# Patient Record
Sex: MALE | Race: WHITE | NOT HISPANIC OR LATINO | Employment: UNEMPLOYED | URBAN - NONMETROPOLITAN AREA
[De-identification: names, ages, dates, MRNs, and addresses within clinical notes are randomized per-mention and may not be internally consistent; named-entity substitution may affect disease eponyms.]

---

## 2022-08-10 RX ORDER — ESCITALOPRAM OXALATE 20 MG/1
TABLET ORAL
Qty: 90 TABLET | OUTPATIENT
Start: 2022-08-10

## 2022-08-10 RX ORDER — LANSOPRAZOLE 30 MG/1
CAPSULE, DELAYED RELEASE ORAL
Qty: 90 CAPSULE | OUTPATIENT
Start: 2022-08-10

## 2022-08-12 RX ORDER — LANSOPRAZOLE 30 MG/1
CAPSULE, DELAYED RELEASE ORAL
Qty: 90 CAPSULE | Refills: 0 | Status: SHIPPED | OUTPATIENT
Start: 2022-08-12 | End: 2022-08-16 | Stop reason: SDUPTHER

## 2022-08-12 RX ORDER — ESCITALOPRAM OXALATE 20 MG/1
TABLET ORAL
Qty: 90 TABLET | OUTPATIENT
Start: 2022-08-12

## 2022-08-15 NOTE — TELEPHONE ENCOUNTER
\Caller: Armando Mcconnell    Relationship: Self    Best call back number: [phone]    Requested Prescriptions:   Requested Prescriptions     Refused Prescriptions Disp Refills   • escitalopram (LEXAPRO) 20 MG tablet [Pharmacy Med Name: ESCITALOPRAM 20 MG TABLET] 90 tablet      Sig: TAKE ONE TABLET BY MOUTH DAILY FOR ANXIETY/IBS     Refused By: ROCIO FORREST     Reason for Refusal: Patient needs an appointment        Pharmacy where request should be sent: [facility] [address] - [phone]  - [phone] FX     Additional details provided by patient: PATIENT IS OUT OF MEDICATION. WILL BE SEEN 08/16    Does the patient have less than a 3 day supply:  [x] Yes  [] No    Fabrice Villarreal Rep   08/15/22 09:38 EDT

## 2022-08-16 ENCOUNTER — TELEMEDICINE (OUTPATIENT)
Dept: FAMILY MEDICINE CLINIC | Facility: CLINIC | Age: 27
End: 2022-08-16

## 2022-08-16 VITALS — WEIGHT: 270 LBS | BODY MASS INDEX: 36.57 KG/M2 | HEIGHT: 72 IN

## 2022-08-16 DIAGNOSIS — K58.0 IRRITABLE BOWEL SYNDROME WITH DIARRHEA: ICD-10-CM

## 2022-08-16 DIAGNOSIS — E66.09 CLASS 2 OBESITY DUE TO EXCESS CALORIES WITHOUT SERIOUS COMORBIDITY WITH BODY MASS INDEX (BMI) OF 36.0 TO 36.9 IN ADULT: ICD-10-CM

## 2022-08-16 DIAGNOSIS — Z00.00 GENERAL MEDICAL EXAM: Primary | ICD-10-CM

## 2022-08-16 DIAGNOSIS — F41.9 ANXIETY: ICD-10-CM

## 2022-08-16 DIAGNOSIS — J30.1 SEASONAL ALLERGIC RHINITIS DUE TO POLLEN: ICD-10-CM

## 2022-08-16 DIAGNOSIS — K21.9 GERD WITHOUT ESOPHAGITIS: ICD-10-CM

## 2022-08-16 DIAGNOSIS — Z13.1 DIABETES MELLITUS SCREENING: ICD-10-CM

## 2022-08-16 PROBLEM — E66.812 CLASS 2 OBESITY DUE TO EXCESS CALORIES WITHOUT SERIOUS COMORBIDITY WITH BODY MASS INDEX (BMI) OF 36.0 TO 36.9 IN ADULT: Status: ACTIVE | Noted: 2022-08-16

## 2022-08-16 PROCEDURE — 99395 PREV VISIT EST AGE 18-39: CPT | Performed by: FAMILY MEDICINE

## 2022-08-16 RX ORDER — ESCITALOPRAM OXALATE 20 MG/1
20 TABLET ORAL DAILY
Qty: 90 TABLET | Refills: 1 | Status: SHIPPED | OUTPATIENT
Start: 2022-08-16 | End: 2023-02-01 | Stop reason: SDUPTHER

## 2022-08-16 RX ORDER — ESCITALOPRAM OXALATE 20 MG/1
20 TABLET ORAL DAILY
COMMUNITY
End: 2022-08-16 | Stop reason: SDUPTHER

## 2022-08-16 RX ORDER — CETIRIZINE HYDROCHLORIDE 10 MG/1
10 CAPSULE, LIQUID FILLED ORAL EVERY 24 HOURS
Qty: 90 CAPSULE | Refills: 1 | Status: SHIPPED | OUTPATIENT
Start: 2022-08-16 | End: 2023-02-01 | Stop reason: SDUPTHER

## 2022-08-16 RX ORDER — FLUTICASONE PROPIONATE 50 MCG
2 SPRAY, SUSPENSION (ML) NASAL DAILY
Qty: 48 G | Refills: 1 | Status: SHIPPED | OUTPATIENT
Start: 2022-08-16 | End: 2023-02-01 | Stop reason: SDUPTHER

## 2022-08-16 RX ORDER — FLUTICASONE PROPIONATE 50 MCG
SPRAY, SUSPENSION (ML) NASAL
COMMUNITY
Start: 2022-08-12 | End: 2022-08-16 | Stop reason: SDUPTHER

## 2022-08-16 RX ORDER — LANSOPRAZOLE 30 MG/1
30 CAPSULE, DELAYED RELEASE ORAL DAILY
Qty: 90 CAPSULE | Refills: 1 | Status: SHIPPED | OUTPATIENT
Start: 2022-08-16 | End: 2023-02-01 | Stop reason: SDUPTHER

## 2022-08-16 RX ORDER — CETIRIZINE HYDROCHLORIDE 10 MG/1
CAPSULE, LIQUID FILLED ORAL EVERY 24 HOURS
COMMUNITY
End: 2022-08-16 | Stop reason: SDUPTHER

## 2022-08-16 NOTE — ASSESSMENT & PLAN NOTE
Controlled with Lexapro.  Tapered off Elavil.  Using dicyclomine infrequently    Colonoscopy up-to-date with GI

## 2022-08-16 NOTE — ASSESSMENT & PLAN NOTE
Controlled with Prevacid.  No dysphagia.    EGD up-to-date with GI at [facility]

## 2022-08-16 NOTE — ASSESSMENT & PLAN NOTE
Reviewed health maintenance and screening.    Will return to get fasting blood work up-to-date.  Encourage diet and exercise efforts

## 2022-08-16 NOTE — PROGRESS NOTES
"Patient was seen today through synchronous audio/video technology. Verbal consent was obtained. The patient was located at home. Vitals signs were not obtained due to lack of home monitoring access.       Chief Complaint  Anxiety    History of Present Illness  Armando Mcconnell is a 27 y.o. male presenting via video-conference today for telehealth physical exam and refills on his Lexapro for anxiety.    Doing well since our last visit together this has been much better controlled was started on Lexapro.  This also helps his anxiety greatly and he is tapered off Elavil for IBS symptoms.  Using dicyclomine very infrequently.    GERD controlled with Prevacid.    Allergies controlled with Zyrtec and Flonase.    Given his family history of diabetes he would like to come in for yearly blood work.  This was last done in July 2021.    The following portions of the patient's history were reviewed and updated as appropriate: allergies, current medications, past family history, past medical history, past social history, past surgical history and problem list.    Review of Systems   Constitutional: Negative for appetite change, chills, fever and unexpected weight change.   HENT: Negative for hearing loss.         Allergies stable with Zyrtec and Flonase.   Eyes: Negative for visual disturbance.   Respiratory: Negative for chest tightness, shortness of breath and wheezing.    Cardiovascular: Negative for chest pain, palpitations and leg swelling.   Gastrointestinal: Negative for abdominal pain.   Musculoskeletal: Negative for arthralgias, back pain and gait problem.   Skin: Negative for rash.   Neurological: Negative for dizziness and headaches.   Psychiatric/Behavioral: Negative for agitation and confusion. The patient is not nervous/anxious.        Objective  Ht 182.9 cm (72\")   Wt 122 kg (270 lb)   BMI 36.62 kg/m²     Physical Exam  Constitutional:       Appearance: He is obese.   HENT:      Head: Normocephalic.      " Right Ear: External ear normal.      Left Ear: External ear normal.      Nose: Nose normal.   Eyes:      Pupils: Pupils are equal, round, and reactive to light.   Cardiovascular:      Rate and Rhythm: Normal rate and regular rhythm.      Heart sounds: Normal heart sounds.   Pulmonary:      Effort: Pulmonary effort is normal.      Breath sounds: Normal breath sounds.   Musculoskeletal:         General: Normal range of motion.      Cervical back: Normal range of motion.   Skin:     General: Skin is warm and dry.   Neurological:      General: No focal deficit present.      Mental Status: He is alert.   Psychiatric:         Mood and Affect: Mood normal.         Behavior: Behavior normal.         Thought Content: Thought content normal.           Assessment/Plan   Diagnoses and all orders for this visit:    1. General medical exam (Primary)  Assessment & Plan:  Reviewed health maintenance and screening.    Will return to get fasting blood work up-to-date.  Encourage diet and exercise efforts    Orders:  -     CBC Auto Differential; Future  -     Comprehensive Metabolic Panel; Future  -     Lipid Panel; Future  -     TSH; Future  -     T4, Free; Future    2. Diabetes mellitus screening  -     Hemoglobin A1c; Future    3. Anxiety  Assessment & Plan:  Well-controlled with Lexapro.  Refilled    Orders:  -     escitalopram (LEXAPRO) 20 MG tablet; Take 1 tablet by mouth Daily.  Dispense: 90 tablet; Refill: 1    4. GERD without esophagitis  Assessment & Plan:  Controlled with Prevacid.  No dysphagia.    EGD up-to-date with GI at [facility]    Orders:  -     lansoprazole (PREVACID) 30 MG capsule; Take 1 capsule by mouth Daily.  Dispense: 90 capsule; Refill: 1    5. Seasonal allergic rhinitis due to pollen  Assessment & Plan:  Continue Zyrtec and Flonase.  Refilled    Orders:  -     Cetirizine HCl (ZyrTEC Allergy) 10 MG capsule; Take 10 mg by mouth Daily.  Dispense: 90 capsule; Refill: 1  -     fluticasone (FLONASE) 50  MCG/ACT nasal spray; 2 sprays into the nostril(s) as directed by provider Daily.  Dispense: 48 g; Refill: 1    6. Irritable bowel syndrome with diarrhea  Assessment & Plan:  Controlled with Lexapro.  Tapered off Elavil.  Using dicyclomine infrequently    Colonoscopy up-to-date with GI       7. Class 2 obesity due to excess calories without serious comorbidity with body mass index (BMI) of 36.0 to 36.9 in adult  Assessment & Plan:  Patient's (Body mass index is 36.62 kg/m².) indicates that they are obese (BMI >30) with health conditions that include none . Weight is unchanged. BMI is is above average; BMI management plan is completed. We discussed portion control and increasing exercise.         No follow-ups on file.    Clay Kwon MD

## 2022-08-16 NOTE — ASSESSMENT & PLAN NOTE
Patient's (Body mass index is 36.62 kg/m².) indicates that they are obese (BMI >30) with health conditions that include none . Weight is unchanged. BMI is is above average; BMI management plan is completed. We discussed portion control and increasing exercise.

## 2022-08-19 ENCOUNTER — LAB (OUTPATIENT)
Dept: FAMILY MEDICINE CLINIC | Facility: CLINIC | Age: 27
End: 2022-08-19

## 2022-08-19 DIAGNOSIS — Z00.00 GENERAL MEDICAL EXAM: ICD-10-CM

## 2022-08-19 DIAGNOSIS — Z13.1 DIABETES MELLITUS SCREENING: ICD-10-CM

## 2022-08-19 PROCEDURE — 36415 COLL VENOUS BLD VENIPUNCTURE: CPT | Performed by: FAMILY MEDICINE

## 2022-08-20 ENCOUNTER — PATIENT MESSAGE (OUTPATIENT)
Dept: FAMILY MEDICINE CLINIC | Facility: CLINIC | Age: 27
End: 2022-08-20

## 2022-08-20 LAB
ALBUMIN SERPL-MCNC: 4.8 G/DL (ref 4.1–5.2)
ALBUMIN/GLOB SERPL: 2.4 {RATIO} (ref 1.2–2.2)
ALP SERPL-CCNC: 87 IU/L (ref 44–121)
ALT SERPL-CCNC: 18 IU/L (ref 0–44)
AST SERPL-CCNC: 18 IU/L (ref 0–40)
BASOPHILS # BLD AUTO: 0.1 X10E3/UL (ref 0–0.2)
BASOPHILS NFR BLD AUTO: 1 %
BILIRUB SERPL-MCNC: 0.3 MG/DL (ref 0–1.2)
BUN SERPL-MCNC: 12 MG/DL (ref 6–20)
BUN/CREAT SERPL: 12 (ref 9–20)
CALCIUM SERPL-MCNC: 9.4 MG/DL (ref 8.7–10.2)
CHLORIDE SERPL-SCNC: 104 MMOL/L (ref 96–106)
CHOLEST SERPL-MCNC: 168 MG/DL (ref 100–199)
CO2 SERPL-SCNC: 24 MMOL/L (ref 20–29)
CREAT SERPL-MCNC: 0.99 MG/DL (ref 0.76–1.27)
EGFRCR-CYS SERPLBLD CKD-EPI 2021: 108 ML/MIN/1.73
EOSINOPHIL # BLD AUTO: 0.2 X10E3/UL (ref 0–0.4)
EOSINOPHIL NFR BLD AUTO: 3 %
ERYTHROCYTE [DISTWIDTH] IN BLOOD BY AUTOMATED COUNT: 12.4 % (ref 11.6–15.4)
GLOBULIN SER CALC-MCNC: 2 G/DL (ref 1.5–4.5)
GLUCOSE SERPL-MCNC: 108 MG/DL (ref 65–99)
HBA1C MFR BLD: 5.6 % (ref 4.8–5.6)
HCT VFR BLD AUTO: 47.5 % (ref 37.5–51)
HDLC SERPL-MCNC: 47 MG/DL
HGB BLD-MCNC: 15.5 G/DL (ref 13–17.7)
IMM GRANULOCYTES # BLD AUTO: 0 X10E3/UL (ref 0–0.1)
IMM GRANULOCYTES NFR BLD AUTO: 0 %
LDLC SERPL CALC-MCNC: 110 MG/DL (ref 0–99)
LYMPHOCYTES # BLD AUTO: 2.3 X10E3/UL (ref 0.7–3.1)
LYMPHOCYTES NFR BLD AUTO: 42 %
MCH RBC QN AUTO: 30.3 PG (ref 26.6–33)
MCHC RBC AUTO-ENTMCNC: 32.6 G/DL (ref 31.5–35.7)
MCV RBC AUTO: 93 FL (ref 79–97)
MONOCYTES # BLD AUTO: 0.5 X10E3/UL (ref 0.1–0.9)
MONOCYTES NFR BLD AUTO: 10 %
NEUTROPHILS # BLD AUTO: 2.4 X10E3/UL (ref 1.4–7)
NEUTROPHILS NFR BLD AUTO: 44 %
PLATELET # BLD AUTO: 325 X10E3/UL (ref 150–450)
POTASSIUM SERPL-SCNC: 4.8 MMOL/L (ref 3.5–5.2)
PROT SERPL-MCNC: 6.8 G/DL (ref 6–8.5)
RBC # BLD AUTO: 5.12 X10E6/UL (ref 4.14–5.8)
SODIUM SERPL-SCNC: 141 MMOL/L (ref 134–144)
T4 FREE SERPL-MCNC: 1.11 NG/DL (ref 0.82–1.77)
TRIGL SERPL-MCNC: 57 MG/DL (ref 0–149)
TSH SERPL DL<=0.005 MIU/L-ACNC: 1.9 UIU/ML (ref 0.45–4.5)
VLDLC SERPL CALC-MCNC: 11 MG/DL (ref 5–40)
WBC # BLD AUTO: 5.4 X10E3/UL (ref 3.4–10.8)

## 2022-08-22 NOTE — TELEPHONE ENCOUNTER
From: Armando Mcconnell  To: Clay Kwon MD  Sent: 8/20/2022 9:09 PM EDT  Subject: Question regarding LIPID PANEL    Do you know what my blood type is?

## 2023-02-01 ENCOUNTER — TELEMEDICINE (OUTPATIENT)
Dept: FAMILY MEDICINE CLINIC | Facility: CLINIC | Age: 28
End: 2023-02-01
Payer: COMMERCIAL

## 2023-02-01 VITALS — WEIGHT: 270 LBS | BODY MASS INDEX: 36.57 KG/M2 | HEIGHT: 72 IN

## 2023-02-01 DIAGNOSIS — F41.0 GENERALIZED ANXIETY DISORDER WITH PANIC ATTACKS: Primary | ICD-10-CM

## 2023-02-01 DIAGNOSIS — K21.9 GERD WITHOUT ESOPHAGITIS: Chronic | ICD-10-CM

## 2023-02-01 DIAGNOSIS — K58.0 IRRITABLE BOWEL SYNDROME WITH DIARRHEA: ICD-10-CM

## 2023-02-01 DIAGNOSIS — F41.1 GENERALIZED ANXIETY DISORDER WITH PANIC ATTACKS: Primary | ICD-10-CM

## 2023-02-01 DIAGNOSIS — J30.1 SEASONAL ALLERGIC RHINITIS DUE TO POLLEN: Chronic | ICD-10-CM

## 2023-02-01 DIAGNOSIS — E66.09 CLASS 2 OBESITY DUE TO EXCESS CALORIES WITHOUT SERIOUS COMORBIDITY WITH BODY MASS INDEX (BMI) OF 36.0 TO 36.9 IN ADULT: ICD-10-CM

## 2023-02-01 PROBLEM — F41.9 ANXIETY: Chronic | Status: RESOLVED | Noted: 2022-08-16 | Resolved: 2023-02-01

## 2023-02-01 PROCEDURE — 99214 OFFICE O/P EST MOD 30 MIN: CPT | Performed by: FAMILY MEDICINE

## 2023-02-01 RX ORDER — LANSOPRAZOLE 30 MG/1
30 CAPSULE, DELAYED RELEASE ORAL DAILY
Qty: 90 CAPSULE | Refills: 1 | Status: SHIPPED | OUTPATIENT
Start: 2023-02-01

## 2023-02-01 RX ORDER — CETIRIZINE HYDROCHLORIDE 10 MG/1
10 CAPSULE, LIQUID FILLED ORAL EVERY 24 HOURS
Qty: 90 CAPSULE | Refills: 1 | Status: SHIPPED | OUTPATIENT
Start: 2023-02-01

## 2023-02-01 RX ORDER — ESCITALOPRAM OXALATE 20 MG/1
20 TABLET ORAL DAILY
Qty: 90 TABLET | Refills: 1 | Status: SHIPPED | OUTPATIENT
Start: 2023-02-01

## 2023-02-01 RX ORDER — FLUTICASONE PROPIONATE 50 MCG
2 SPRAY, SUSPENSION (ML) NASAL DAILY
Qty: 48 G | Refills: 1 | Status: SHIPPED | OUTPATIENT
Start: 2023-02-01

## 2023-02-01 NOTE — ASSESSMENT & PLAN NOTE
Stable controlled with Lexapro for anxiety and IBS symptoms.  GERD remains manageable with Prevacid

## 2023-02-01 NOTE — ASSESSMENT & PLAN NOTE
Continue with Zyrtec and Flonase.  Discussed ability to go up on Zyrtec dose to 20 mg for skin eruption/hives if needed.

## 2023-02-01 NOTE — PROGRESS NOTES
Medication: Tramadol (Ultram)   Dosage: 50 mg   Sig: Take 1 tablet by mouth every 8 hours as needed for pain   Last Refill: Dispensed 2/21/22  Last Office Visit for this diagnosis or CPE/MWV/PREOP: 3/4/22  (Return not specified)  Next Appt:  4/19/22   Pertinent labs UTD: Yes      PDMP reviewed and documented       Sent to Provider to advise on Refill(s) Patient was seen today through synchronous audio/video technology. Verbal consent was obtained. The patient was located at home. Vitals signs were not obtained due to lack of home monitoring access.     I was located at our [facility] office for this telehealth visit.    Chief Complaint  Med Refill    History of Present Illness  Armando Mcconnell is a 27 y.o. male presenting via video-conference today for follow-up visit medication refills.    Doing well since our last visit together in August for physical.    Fasting blood work up-to-date in August with mild blood sugar and triglyceride elevation.  Encourage diet and exercise efforts given family history of diabetes in both parents.    Anxiety and IBS symptoms remain manageable with Lexapro at 20 mg dosing.  He has been able to effectively get off dicyclomine given improvement in IBS with Lexapro.  Anxiety is well controlled.    GERD stable with Prevacid.  No dysphagia.    Recurrent problems with seasonal and skin allergy stable with combination of Zyrtec and Flonase.  If he misses a dose of Zyrtec he does tend to have some scalp itching.  Discussed higher dosing of Zyrtec can be helpful for hives he can go up to 20 mg daily if needed.        The following portions of the patient's history were reviewed and updated as appropriate: allergies, current medications, past family history, past medical history, past social history, past surgical history and problem list.    Review of Systems   Constitutional: Negative for appetite change, chills, fever and unexpected weight change.   HENT: Negative for hearing loss.    Eyes: Negative for visual disturbance.   Respiratory: Negative for chest tightness, shortness of breath and wheezing.    Cardiovascular: Negative for chest pain, palpitations and leg swelling.   Gastrointestinal: Negative for abdominal pain.   Musculoskeletal: Negative for arthralgias, back pain and gait problem.   Skin:  "Negative for rash.   Neurological: Negative for dizziness and headaches.   Psychiatric/Behavioral: Negative for agitation and confusion. The patient is not nervous/anxious.        Objective  Ht 182.9 cm (72\")   Wt 122 kg (270 lb)   BMI 36.62 kg/m²     Physical Exam  Constitutional:       General: He is not in acute distress.     Appearance: He is obese. He is not ill-appearing.   HENT:      Head: Normocephalic and atraumatic.      Right Ear: External ear normal.      Left Ear: External ear normal.      Nose: Nose normal.   Eyes:      Extraocular Movements: Extraocular movements intact.      Conjunctiva/sclera: Conjunctivae normal.      Pupils: Pupils are equal, round, and reactive to light.   Pulmonary:      Effort: Pulmonary effort is normal. No respiratory distress.   Musculoskeletal:         General: Normal range of motion.      Cervical back: Normal range of motion.   Skin:     Findings: No rash.   Neurological:      General: No focal deficit present.      Mental Status: He is alert and oriented to person, place, and time.   Psychiatric:         Mood and Affect: Mood normal.         Behavior: Behavior normal.         Thought Content: Thought content normal.           Assessment/Plan   Diagnoses and all orders for this visit:    1. Generalized anxiety disorder with panic attacks (Primary)  Assessment & Plan:  Psychological condition is improving with treatment.  Continue current treatment regimen.  Psychological condition  will be reassessed at the next regular appointment.    Orders:  -     escitalopram (LEXAPRO) 20 MG tablet; Take 1 tablet by mouth Daily.  Dispense: 90 tablet; Refill: 1    2. GERD without esophagitis  Assessment & Plan:  Continue Prevacid    Orders:  -     lansoprazole (PREVACID) 30 MG capsule; Take 1 capsule by mouth Daily.  Dispense: 90 capsule; Refill: 1    3. Seasonal allergic rhinitis due to pollen  Assessment & Plan:  Continue with Zyrtec and Flonase.  Discussed ability to go up on " Zyrtec dose to 20 mg for skin eruption/hives if needed.    Orders:  -     fluticasone (FLONASE) 50 MCG/ACT nasal spray; 2 sprays into the nostril(s) as directed by provider Daily.  Dispense: 48 g; Refill: 1  -     Cetirizine HCl (ZyrTEC Allergy) 10 MG capsule; Take 10 mg by mouth Daily.  Dispense: 90 capsule; Refill: 1    4. Irritable bowel syndrome with diarrhea  Assessment & Plan:  Stable controlled with Lexapro for anxiety and IBS symptoms.  GERD remains manageable with Prevacid      5. Class 2 obesity due to excess calories without serious comorbidity with body mass index (BMI) of 36.0 to 36.9 in adult      Class 2 Severe Obesity (BMI >=35 and <=39.9). Obesity-related health conditions include the following: GERD. Obesity is unchanged. BMI is is above average; BMI management plan is completed. We discussed portion control and increasing exercise.       Return in about 6 months (around 8/1/2023) for Annual physical, Med recheck.    Clay Kwon MD

## 2023-02-03 ENCOUNTER — TELEPHONE (OUTPATIENT)
Dept: FAMILY MEDICINE CLINIC | Facility: CLINIC | Age: 28
End: 2023-02-03
Payer: COMMERCIAL

## 2023-02-03 NOTE — TELEPHONE ENCOUNTER
HUB CAN READ & SCHEDULE  CHER PATIENT  Cher would like patient to schedule 6 monthPlease schedule patient for follow-up visit in 6 months for physical exam.  Please schedule this in office if he is willing and ask him to be fasting at time of appointment to get fasting blood work up-to-date.

## 2023-10-11 ENCOUNTER — TELEPHONE (OUTPATIENT)
Dept: FAMILY MEDICINE CLINIC | Facility: CLINIC | Age: 28
End: 2023-10-11

## 2023-10-11 ENCOUNTER — OFFICE VISIT (OUTPATIENT)
Dept: FAMILY MEDICINE CLINIC | Facility: CLINIC | Age: 28
End: 2023-10-11

## 2023-10-11 VITALS
WEIGHT: 264 LBS | OXYGEN SATURATION: 98 % | BODY MASS INDEX: 35.76 KG/M2 | SYSTOLIC BLOOD PRESSURE: 114 MMHG | HEART RATE: 94 BPM | DIASTOLIC BLOOD PRESSURE: 80 MMHG | HEIGHT: 72 IN

## 2023-10-11 DIAGNOSIS — E66.09 CLASS 2 OBESITY DUE TO EXCESS CALORIES WITHOUT SERIOUS COMORBIDITY WITH BODY MASS INDEX (BMI) OF 36.0 TO 36.9 IN ADULT: ICD-10-CM

## 2023-10-11 DIAGNOSIS — K58.0 IRRITABLE BOWEL SYNDROME WITH DIARRHEA: ICD-10-CM

## 2023-10-11 DIAGNOSIS — K21.9 GERD WITHOUT ESOPHAGITIS: Chronic | ICD-10-CM

## 2023-10-11 DIAGNOSIS — J30.1 SEASONAL ALLERGIC RHINITIS DUE TO POLLEN: Chronic | ICD-10-CM

## 2023-10-11 DIAGNOSIS — Z00.00 GENERAL MEDICAL EXAM: Primary | ICD-10-CM

## 2023-10-11 DIAGNOSIS — F41.1 GENERALIZED ANXIETY DISORDER WITH PANIC ATTACKS: ICD-10-CM

## 2023-10-11 DIAGNOSIS — R73.9 HYPERGLYCEMIA: ICD-10-CM

## 2023-10-11 DIAGNOSIS — F41.0 GENERALIZED ANXIETY DISORDER WITH PANIC ATTACKS: ICD-10-CM

## 2023-10-11 RX ORDER — DICYCLOMINE HCL 20 MG
20 TABLET ORAL 3 TIMES DAILY
Qty: 90 TABLET | Refills: 5 | Status: SHIPPED | OUTPATIENT
Start: 2023-10-11

## 2023-10-11 RX ORDER — LANSOPRAZOLE 30 MG/1
30 CAPSULE, DELAYED RELEASE ORAL DAILY
Qty: 90 CAPSULE | Refills: 1 | Status: SHIPPED | OUTPATIENT
Start: 2023-10-11

## 2023-10-11 RX ORDER — CETIRIZINE HYDROCHLORIDE 10 MG/1
10 CAPSULE, LIQUID FILLED ORAL EVERY 24 HOURS
Qty: 90 CAPSULE | Refills: 1 | Status: SHIPPED | OUTPATIENT
Start: 2023-10-11

## 2023-10-11 RX ORDER — ESCITALOPRAM OXALATE 20 MG/1
20 TABLET ORAL DAILY
Qty: 90 TABLET | Refills: 1 | Status: SHIPPED | OUTPATIENT
Start: 2023-10-11

## 2023-10-11 NOTE — TELEPHONE ENCOUNTER
PATIENT CALLED AND SAID HE WAS SUPPOSED TO HAVE SOMETHING CALLED IN FOR NAUSEA ALONG WITH THIS OTHER PRESCRIPTIONS TODAY .

## 2023-10-12 ENCOUNTER — TELEPHONE (OUTPATIENT)
Dept: FAMILY MEDICINE CLINIC | Facility: CLINIC | Age: 28
End: 2023-10-12
Payer: COMMERCIAL

## 2023-10-12 ENCOUNTER — TELEPHONE (OUTPATIENT)
Dept: FAMILY MEDICINE CLINIC | Facility: CLINIC | Age: 28
End: 2023-10-12

## 2023-10-12 NOTE — TELEPHONE ENCOUNTER
Caller: Armando Mcconnell    Relationship: Self    Best call back number: [phone]     Requested Prescriptions:   Requested Prescriptions      No prescriptions requested or ordered in this encounter    NAUSEA MEDICATION     Pharmacy where request should be sent: [facility] [id number] [address] - [phone]  - [phone] FX     Last office visit with prescribing clinician: Visit date not found   Last telemedicine visit with prescribing clinician: Visit date not found   Next office visit with prescribing clinician: 10/17/2023     Additional details provided by patient: PATIENT WAS SEEN 10.11.23 BY CHARLEY SHELL AND HE WAS TOLD HE WOULD HAVE A NAUSEA MEDICATION AT THE PHARMACY. PATIENT STATES THIS PRESCRIPTION IS NOT AT THE PHARMACY AND HE IS REQUESTING TO HAVE IT SENT IN.     Fabrice Caban Rep   10/12/23 11:40 EDT

## 2023-10-13 RX ORDER — ONDANSETRON 4 MG/1
4 TABLET, FILM COATED ORAL EVERY 8 HOURS PRN
Qty: 30 TABLET | Refills: 1 | Status: SHIPPED | OUTPATIENT
Start: 2023-10-13

## 2023-10-13 NOTE — TELEPHONE ENCOUNTER
Caller: Armando Mcconnell    Relationship: Self    Best call back number: [phone]     What was the call regarding:   PATIENT WOULD LIKE A CALL BACK REGARDING BEING INFORMED BY CHARLEY SHELL PA-C ON HIS APPOINTMENT 10/11/2023 TO SHE WAS GOING TO HAVE NAUSEA MEDICATION CALLED INTO THE PHARMACY FOR THE PATIENT. PATIENT STATED THAT HE NEVER GOT THE MEDICATION AND THE PHARMACY HAS INFORMED HIM THAT A NAUSEA MEDICATION WAS NOT CALLED IN.     PATIENT WOULD LIKE FOR THE NAUSEA MEDICATION TO BE CALLED INTO THE PHARMACY TO HELP AS SOON AS POSSIBLE     [facility] [id number] [place], [place] - [address] - [phone] PH - [phone]  [phone]

## 2023-10-14 NOTE — PROGRESS NOTES
"Chief Complaint  Depression and GI Problem (Started Thursday )    Subjective        Armando Mcconnell presents to [facility]  History of Present Illness  Patient reports this date secondary to feeling bad since Thursday of last week.  Patient reports his dog  and since then he has had a flareup of irritable bowel syndrome.  Patient reports he takes Bentyl twice daily however he continues to have diarrhea and nausea vomiting.  Patient reports feeling like his anxiety is elevated and he is unable to focus.  Reports he has not been to work since Thursday.    Patient also reports for physical and fasting labs.    Patient reports having seasonal allergies states he needs a refill of his medications and they continue to work well.    Patient reports having acid reflux states he is taking lansoprazole and it works well.  Depression  GI Problem        Objective   Vital Signs:  /80   Pulse 94   Ht 182.9 cm (72\")   Wt 120 kg (264 lb)   SpO2 98%   BMI 35.80 kg/mý   Estimated body mass index is 35.8 kg/mý as calculated from the following:    Height as of this encounter: 182.9 cm (72\").    Weight as of this encounter: 120 kg (264 lb).               Physical Exam  Vitals and nursing note reviewed.   Constitutional:       General: He is not in acute distress.     Appearance: He is obese. He is not ill-appearing.   HENT:      Head: Normocephalic.      Right Ear: Tympanic membrane, ear canal and external ear normal.      Left Ear: Tympanic membrane, ear canal and external ear normal.      Nose: Nose normal.      Mouth/Throat:      Mouth: Mucous membranes are moist.   Eyes:      Pupils: Pupils are equal, round, and reactive to light.   Cardiovascular:      Rate and Rhythm: Normal rate and regular rhythm.      Pulses: Normal pulses.   Pulmonary:      Effort: Pulmonary effort is normal. No respiratory distress.   Abdominal:      General: Bowel sounds are normal.      Palpations: Abdomen " is soft.      Tenderness: There is no abdominal tenderness. There is no guarding or rebound.   Musculoskeletal:         General: Normal range of motion.      Cervical back: Normal range of motion.   Skin:     General: Skin is warm and dry.      Capillary Refill: Capillary refill takes less than 2 seconds.   Neurological:      General: No focal deficit present.      Mental Status: He is oriented to person, place, and time.   Psychiatric:         Mood and Affect: Mood normal.        Result Review :                   Assessment and Plan   Diagnoses and all orders for this visit:    1. General medical exam (Primary)  Assessment & Plan:  Discussed injury prevention, diet and exercise, safe sexual practices, and screening for common diseases. Encouraged use of sunscreen and seatbelts. Discussed timing of colon cancer cancer screening, prostate cancer screening, and review of skin for lesions. Avoidance of tobacco encouraged. Limitation or avoidance of alcohol encouraged. Recommend yearly dental and eye exams. Also discussed monitoring of blood pressure and lipids.       Orders:  -     CBC & Differential; Future  -     Comprehensive Metabolic Panel; Future  -     TSH; Future  -     Lipid Panel; Future    2. Generalized anxiety disorder with panic attacks  Assessment & Plan:  Psychological condition is worsening.  Continue current treatment regimen.  Psychological condition  will be reassessed  advised patient he is likely having an acute stress reaction secondary to recent death of his pet.  Recommended he continue current treatment plan with the citalopram however if no improvement after 3 to 4 weeks recommended therapy and he can return to clinic for medication adjustments.  Patient knowledge understanding .    Orders:  -     escitalopram (LEXAPRO) 20 MG tablet; Take 1 tablet by mouth Daily.  Dispense: 90 tablet; Refill: 1    3. Irritable bowel syndrome with diarrhea  Assessment & Plan:  Increase patient's dicyclomine  and advised him to start taking it 3 times daily as instructed.  Patient also prescribed ondansetron for further relief of symptoms.    Orders:  -     dicyclomine (BENTYL) 20 MG tablet; Take 1 tablet by mouth 3 (Three) Times a Day. AS NEEDED FOR DIARRHEA/STOMACH PAIN  Dispense: 90 tablet; Refill: 5    4. GERD without esophagitis  Assessment & Plan:  Medications refilled this date    Orders:  -     lansoprazole (PREVACID) 30 MG capsule; Take 1 capsule by mouth Daily.  Dispense: 90 capsule; Refill: 1    5. Seasonal allergic rhinitis due to pollen  Assessment & Plan:  Refill patient's medication    Orders:  -     Cetirizine HCl (ZyrTEC Allergy) 10 MG capsule; Take 10 mg by mouth Daily.  Dispense: 90 capsule; Refill: 1    6. Hyperglycemia  Assessment & Plan:  We will check A1c, discussed ADA diet    Orders:  -     Hemoglobin A1c; Future    7. Class 2 obesity due to excess calories without serious comorbidity with body mass index (BMI) of 36.0 to 36.9 in adult  Assessment & Plan:  Patient's (Body mass index is 35.8 kg/mý.) indicates that they are morbidly/severely obese (BMI > 40 or > 35 with obesity - related health condition) with health conditions that include GERD . Weight is improving with lifestyle modifications. BMI  is above average; BMI management plan is completed. We discussed low calorie, low carb based diet program, portion control, and increasing exercise.       Other orders  -     ondansetron (Zofran) 4 MG tablet; Take 1 tablet by mouth Every 8 (Eight) Hours As Needed for Nausea or Vomiting.  Dispense: 30 tablet; Refill: 1             Follow Up   Return if symptoms worsen or fail to improve.  Patient was given instructions and counseling regarding his condition or for health maintenance advice. Please see specific information pulled into the AVS if appropriate.

## 2023-10-14 NOTE — ASSESSMENT & PLAN NOTE
Increase patient's dicyclomine and advised him to start taking it 3 times daily as instructed.  Patient also prescribed ondansetron for further relief of symptoms.

## 2023-10-14 NOTE — ASSESSMENT & PLAN NOTE
Psychological condition is worsening.  Continue current treatment regimen.  Psychological condition  will be reassessed  advised patient he is likely having an acute stress reaction secondary to recent death of his pet.  Recommended he continue current treatment plan with the citalopram however if no improvement after 3 to 4 weeks recommended therapy and he can return to clinic for medication adjustments.  Patient knowledge understanding .

## 2023-10-14 NOTE — ASSESSMENT & PLAN NOTE
Patient's (Body mass index is 35.8 kg/mý.) indicates that they are morbidly/severely obese (BMI > 40 or > 35 with obesity - related health condition) with health conditions that include GERD . Weight is improving with lifestyle modifications. BMI  is above average; BMI management plan is completed. We discussed low calorie, low carb based diet program, portion control, and increasing exercise.

## 2023-10-17 ENCOUNTER — OFFICE VISIT (OUTPATIENT)
Dept: FAMILY MEDICINE CLINIC | Facility: CLINIC | Age: 28
End: 2023-10-17
Payer: COMMERCIAL

## 2023-10-17 VITALS
WEIGHT: 264.6 LBS | OXYGEN SATURATION: 96 % | HEIGHT: 72 IN | HEART RATE: 68 BPM | DIASTOLIC BLOOD PRESSURE: 84 MMHG | BODY MASS INDEX: 35.84 KG/M2 | SYSTOLIC BLOOD PRESSURE: 122 MMHG | TEMPERATURE: 98 F

## 2023-10-17 DIAGNOSIS — K58.0 IRRITABLE BOWEL SYNDROME WITH DIARRHEA: ICD-10-CM

## 2023-10-17 DIAGNOSIS — E66.09 CLASS 2 OBESITY DUE TO EXCESS CALORIES WITHOUT SERIOUS COMORBIDITY WITH BODY MASS INDEX (BMI) OF 35.0 TO 35.9 IN ADULT: ICD-10-CM

## 2023-10-17 DIAGNOSIS — F41.1 GENERALIZED ANXIETY DISORDER WITH PANIC ATTACKS: ICD-10-CM

## 2023-10-17 DIAGNOSIS — F41.0 GENERALIZED ANXIETY DISORDER WITH PANIC ATTACKS: ICD-10-CM

## 2023-10-17 DIAGNOSIS — K21.9 GERD WITHOUT ESOPHAGITIS: Chronic | ICD-10-CM

## 2023-10-17 DIAGNOSIS — J30.1 SEASONAL ALLERGIC RHINITIS DUE TO POLLEN: Chronic | ICD-10-CM

## 2023-10-17 DIAGNOSIS — F32.9 REACTIVE DEPRESSION: Primary | ICD-10-CM

## 2023-10-17 PROBLEM — E66.812 CLASS 2 OBESITY DUE TO EXCESS CALORIES WITHOUT SERIOUS COMORBIDITY WITH BODY MASS INDEX (BMI) OF 35.0 TO 35.9 IN ADULT: Status: ACTIVE | Noted: 2023-10-17

## 2023-10-17 RX ORDER — FLUTICASONE PROPIONATE 50 MCG
2 SPRAY, SUSPENSION (ML) NASAL DAILY
Qty: 48 G | Refills: 1 | Status: SHIPPED | OUTPATIENT
Start: 2023-10-17

## 2023-10-17 RX ORDER — LANSOPRAZOLE 30 MG/1
30 CAPSULE, DELAYED RELEASE ORAL DAILY
Qty: 90 CAPSULE | Refills: 1 | Status: SHIPPED | OUTPATIENT
Start: 2023-10-17

## 2023-10-17 RX ORDER — CETIRIZINE HYDROCHLORIDE 10 MG/1
10 CAPSULE, LIQUID FILLED ORAL EVERY 24 HOURS
Qty: 90 CAPSULE | Refills: 1 | Status: SHIPPED | OUTPATIENT
Start: 2023-10-17

## 2023-10-17 RX ORDER — HYOSCYAMINE SULFATE 0.12 MG/1
0.12 TABLET SUBLINGUAL EVERY 4 HOURS PRN
Qty: 30 EACH | Refills: 5 | Status: SHIPPED | OUTPATIENT
Start: 2023-10-17

## 2023-10-17 RX ORDER — ESCITALOPRAM OXALATE 20 MG/1
20 TABLET ORAL DAILY
Qty: 90 TABLET | Refills: 1 | Status: SHIPPED | OUTPATIENT
Start: 2023-10-17

## 2023-10-17 NOTE — ASSESSMENT & PLAN NOTE
Patient's (Body mass index is 35.89 kg/m².) indicates that they are obese (BMI >30) with health conditions that include GERD . Weight is improving with lifestyle modifications. BMI  is above average; BMI management plan is completed. We discussed portion control and increasing exercise.

## 2023-10-17 NOTE — ASSESSMENT & PLAN NOTE
Given trial with change to as needed Levsin sublingual due to side effects with higher dosing of dicyclomine.    He does have Zofran to use as needed for nausea and continues Prevacid for GERD

## 2023-10-17 NOTE — ASSESSMENT & PLAN NOTE
Patient's depression is single episode and is mild without psychosis. Their depression is currently in full remission and the condition is newly identified. This will be reassessed at the next regular appointment. F/U as described:patient will continue current medication therapy.    He is overall doing much better since his visit earlier this month.  We did discuss the ability to add in Wellbutrin XL with his Lexapro but he would like to hold off on medication changes at this time

## 2023-10-17 NOTE — PROGRESS NOTES
"Chief Complaint  Depression    Subjective    History of Present Illness:  Armando Mcconnell is a 28 y.o. male who presents today for follow-up regarding flareups with depression and IBS symptoms that happened after their family dog recently passed away.    He was seen earlier this month and had adjustment with his dicyclomine but had too many side effects with dose increase.  Overall his depression is doing better and he would like to hold off on adding Wellbutrin to his Lexapro but understands this is an option if his symptoms persist.    Allergies stable with Zyrtec and Flonase.    GERD controlled with Prevacid.    He is interested in trying Levsin sublingual instead of dicyclomine given the side effects he experienced with higher dose dicyclomine.        Objective   Vital Signs:   /84   Pulse 68   Temp 98 °F (36.7 °C)   Ht 182.9 cm (72\")   Wt 120 kg (264 lb 9.6 oz)   SpO2 96%   BMI 35.89 kg/m²     Review of Systems   Constitutional:  Negative for appetite change, chills and fever.   HENT:  Negative for hearing loss.    Eyes:  Negative for blurred vision.   Respiratory:  Negative for chest tightness.    Cardiovascular:  Negative for chest pain.   Gastrointestinal:  Negative for abdominal pain.   Musculoskeletal:  Negative for gait problem.   Skin:  Negative for rash.   Psychiatric/Behavioral:  Positive for stress. Negative for depressed mood.        Past History:  Medical History: has a past medical history of Allergic, Depression, Exercise-induced asthma, GERD (gastroesophageal reflux disease), and Pharyngitis.   Surgical History: has no past surgical history on file.   Family History: family history includes Migraines in his mother.   Social History: reports that he has never smoked. He has never been exposed to tobacco smoke. He has never used smokeless tobacco. He reports that he does not currently use alcohol. He reports that he does not use drugs.      Current Outpatient Medications:     " Cetirizine HCl (ZyrTEC Allergy) 10 MG capsule, Take 10 mg by mouth Daily., Disp: 90 capsule, Rfl: 1    escitalopram (LEXAPRO) 20 MG tablet, Take 1 tablet by mouth Daily., Disp: 90 tablet, Rfl: 1    fluticasone (FLONASE) 50 MCG/ACT nasal spray, 2 sprays into the nostril(s) as directed by provider Daily., Disp: 48 g, Rfl: 1    lansoprazole (PREVACID) 30 MG capsule, Take 1 capsule by mouth Daily., Disp: 90 capsule, Rfl: 1    ondansetron (Zofran) 4 MG tablet, Take 1 tablet by mouth Every 8 (Eight) Hours As Needed for Nausea or Vomiting., Disp: 30 tablet, Rfl: 1    Hyoscyamine Sulfate SL (Levsin/SL) 0.125 MG sublingual tablet, Place 0.125 mg under the tongue Every 4 (Four) Hours As Needed (abdominal cramping/bloating). (Replaces dicyclomine), Disp: 30 each, Rfl: 5    Allergies: Ibuprofen    Physical Exam  Constitutional:       Appearance: He is obese.   HENT:      Head: Normocephalic.      Right Ear: External ear normal.      Left Ear: External ear normal.      Nose: Nose normal.   Eyes:      Pupils: Pupils are equal, round, and reactive to light.   Cardiovascular:      Rate and Rhythm: Normal rate and regular rhythm.      Heart sounds: Normal heart sounds.   Pulmonary:      Effort: Pulmonary effort is normal.      Breath sounds: Normal breath sounds.   Musculoskeletal:         General: Normal range of motion.      Cervical back: Normal range of motion.   Skin:     General: Skin is warm and dry.   Neurological:      General: No focal deficit present.      Mental Status: He is alert.   Psychiatric:         Mood and Affect: Mood normal.         Behavior: Behavior normal.         Thought Content: Thought content normal.          Result Review                   Assessment and Plan  Diagnoses and all orders for this visit:    1. Reactive depression (Primary)  Assessment & Plan:  Patient's depression is single episode and is mild without psychosis. Their depression is currently in full remission and the condition is newly  identified. This will be reassessed at the next regular appointment. F/U as described:patient will continue current medication therapy.    He is overall doing much better since his visit earlier this month.  We did discuss the ability to add in Wellbutrin XL with his Lexapro but he would like to hold off on medication changes at this time      2. Generalized anxiety disorder with panic attacks  Assessment & Plan:  Psychological condition is improving with treatment.  Continue current treatment regimen.  Psychological condition  will be reassessed at the next regular appointment.    Orders:  -     escitalopram (LEXAPRO) 20 MG tablet; Take 1 tablet by mouth Daily.  Dispense: 90 tablet; Refill: 1    3. GERD without esophagitis  Assessment & Plan:  Continue Prevacid    Orders:  -     lansoprazole (PREVACID) 30 MG capsule; Take 1 capsule by mouth Daily.  Dispense: 90 capsule; Refill: 1    4. Seasonal allergic rhinitis due to pollen  Assessment & Plan:  Continue with Zyrtec and Flonase.  Discussed ability to go up on Zyrtec dose to 20 mg for skin eruption/hives if needed.    Orders:  -     fluticasone (FLONASE) 50 MCG/ACT nasal spray; 2 sprays into the nostril(s) as directed by provider Daily.  Dispense: 48 g; Refill: 1  -     Cetirizine HCl (ZyrTEC Allergy) 10 MG capsule; Take 10 mg by mouth Daily.  Dispense: 90 capsule; Refill: 1    5. Irritable bowel syndrome with diarrhea  Assessment & Plan:  Given trial with change to as needed Levsin sublingual due to side effects with higher dosing of dicyclomine.    He does have Zofran to use as needed for nausea and continues Prevacid for GERD    Orders:  -     Hyoscyamine Sulfate SL (Levsin/SL) 0.125 MG sublingual tablet; Place 0.125 mg under the tongue Every 4 (Four) Hours As Needed (abdominal cramping/bloating). (Replaces dicyclomine)  Dispense: 30 each; Refill: 5    6. Class 2 obesity due to excess calories without serious comorbidity with body mass index (BMI) of 35.0 to  35.9 in adult  Assessment & Plan:  Patient's (Body mass index is 35.89 kg/m².) indicates that they are obese (BMI >30) with health conditions that include GERD . Weight is improving with lifestyle modifications. BMI  is above average; BMI management plan is completed. We discussed portion control and increasing exercise.                      Follow Up  Return in about 6 months (around 4/17/2024) for Med recheck.    Clay Kwon MD

## 2023-10-24 ENCOUNTER — PATIENT MESSAGE (OUTPATIENT)
Dept: FAMILY MEDICINE CLINIC | Facility: CLINIC | Age: 28
End: 2023-10-24
Payer: COMMERCIAL

## 2023-10-24 DIAGNOSIS — F32.9 REACTIVE DEPRESSION: Primary | ICD-10-CM

## 2023-10-24 RX ORDER — BUPROPION HYDROCHLORIDE 150 MG/1
150 TABLET ORAL EVERY MORNING
Qty: 30 TABLET | Refills: 3 | Status: SHIPPED | OUTPATIENT
Start: 2023-10-24

## 2023-10-24 NOTE — TELEPHONE ENCOUNTER
From: Armando Mcconnell  To: Clay Kwon  Sent: 10/24/2023 10:48 AM EDT  Subject: Lexapro    Hi Dr. Kwon,    I was seeing if you could send in that medication that goes with the Lexapro. I've been feeling tired and weak and just not into it lately. Thanks

## 2023-11-06 ENCOUNTER — PATIENT MESSAGE (OUTPATIENT)
Dept: FAMILY MEDICINE CLINIC | Facility: CLINIC | Age: 28
End: 2023-11-06
Payer: COMMERCIAL

## 2023-11-07 NOTE — TELEPHONE ENCOUNTER
From: Armando Mcconnell  To: Clay Kwon  Sent: 11/6/2023 4:26 PM EST  Subject: Finasteride    Hi Dr. Kwon,    I'm thinking about starting Finasteride to help with my hairline loss. Would it be ok to take with my other medications?    Thanks

## 2023-12-21 ENCOUNTER — PATIENT MESSAGE (OUTPATIENT)
Dept: FAMILY MEDICINE CLINIC | Facility: CLINIC | Age: 28
End: 2023-12-21
Payer: COMMERCIAL

## 2023-12-22 NOTE — TELEPHONE ENCOUNTER
From: Armando Mcconnell  To: Clay Kwno  Sent: 12/21/2023 6:39 PM EST  Subject: Itching     Hey Dr. Kwon,    I've started to get itchy at night time lately. Should I start taking another allergy pill at night as well as morning?

## 2024-02-19 DIAGNOSIS — F32.9 REACTIVE DEPRESSION: ICD-10-CM

## 2024-02-19 RX ORDER — BUPROPION HYDROCHLORIDE 150 MG/1
TABLET ORAL
Qty: 90 TABLET | Refills: 0 | Status: SHIPPED | OUTPATIENT
Start: 2024-02-19

## 2024-02-28 ENCOUNTER — PATIENT MESSAGE (OUTPATIENT)
Dept: FAMILY MEDICINE CLINIC | Facility: CLINIC | Age: 29
End: 2024-02-28
Payer: COMMERCIAL

## 2024-03-03 NOTE — TELEPHONE ENCOUNTER
From: Armando Mcconnell  To: Clay Kwon  Sent: 2/28/2024 6:56 PM EST  Subject: Vitamins    Hey   I am subscribed to a monthly vitamin routine. I wanted to check with you to see if it was OK for me to take these vitamins. Magnesium, multivitamin, omega 3, rhodiola, and chromium + apple extract

## 2024-03-22 ENCOUNTER — PATIENT MESSAGE (OUTPATIENT)
Dept: FAMILY MEDICINE CLINIC | Facility: CLINIC | Age: 29
End: 2024-03-22
Payer: COMMERCIAL

## 2024-03-25 NOTE — TELEPHONE ENCOUNTER
From: Armando Mcconnell  To: Clay Kwon  Sent: 3/22/2024 9:08 PM EDT  Subject: Wellbutrin    Kennedy Combs,    I've been taking wellbutrin for a but now and it really helped when when I was going through my depression at the end of the year last year. Now it seems like I'm doing much better, but It seems since taking wellbutrin I very gained some weight and am tired alot. I feel hungry all the time and sort of no limit to eating. I also take alot more naps then I usually would. Do you think I need to still be taking it?    Thanks

## 2024-03-26 ENCOUNTER — PATIENT MESSAGE (OUTPATIENT)
Dept: FAMILY MEDICINE CLINIC | Facility: CLINIC | Age: 29
End: 2024-03-26
Payer: COMMERCIAL

## 2024-03-26 NOTE — TELEPHONE ENCOUNTER
From: Armando Mcconnell  To: Clay Kwon  Sent: 3/26/2024 9:31 AM EDT  Subject: Physical therapy    Hey Dr. Kwon,    I injured my back last night at the gym. I have an appointment with [facility] today. I am just messaging to let you know and incase insurance requires a referral

## 2024-04-17 ENCOUNTER — OFFICE VISIT (OUTPATIENT)
Dept: FAMILY MEDICINE CLINIC | Facility: CLINIC | Age: 29
End: 2024-04-17
Payer: COMMERCIAL

## 2024-04-17 VITALS
SYSTOLIC BLOOD PRESSURE: 122 MMHG | DIASTOLIC BLOOD PRESSURE: 78 MMHG | OXYGEN SATURATION: 98 % | WEIGHT: 302 LBS | BODY MASS INDEX: 40.9 KG/M2 | HEART RATE: 76 BPM | RESPIRATION RATE: 20 BRPM | HEIGHT: 72 IN

## 2024-04-17 DIAGNOSIS — F41.0 GENERALIZED ANXIETY DISORDER WITH PANIC ATTACKS: Chronic | ICD-10-CM

## 2024-04-17 DIAGNOSIS — F41.1 GENERALIZED ANXIETY DISORDER WITH PANIC ATTACKS: Chronic | ICD-10-CM

## 2024-04-17 DIAGNOSIS — E66.01 CLASS 3 SEVERE OBESITY DUE TO EXCESS CALORIES WITHOUT SERIOUS COMORBIDITY WITH BODY MASS INDEX (BMI) OF 40.0 TO 44.9 IN ADULT: ICD-10-CM

## 2024-04-17 DIAGNOSIS — Z00.00 GENERAL MEDICAL EXAM: Primary | ICD-10-CM

## 2024-04-17 DIAGNOSIS — Z13.1 DIABETES MELLITUS SCREENING: ICD-10-CM

## 2024-04-17 DIAGNOSIS — K58.0 IRRITABLE BOWEL SYNDROME WITH DIARRHEA: ICD-10-CM

## 2024-04-17 DIAGNOSIS — F32.9 REACTIVE DEPRESSION: ICD-10-CM

## 2024-04-17 DIAGNOSIS — R73.9 HYPERGLYCEMIA: ICD-10-CM

## 2024-04-17 DIAGNOSIS — K21.9 GERD WITHOUT ESOPHAGITIS: Chronic | ICD-10-CM

## 2024-04-17 DIAGNOSIS — J30.1 SEASONAL ALLERGIC RHINITIS DUE TO POLLEN: Chronic | ICD-10-CM

## 2024-04-17 PROBLEM — E66.09 CLASS 2 OBESITY DUE TO EXCESS CALORIES WITHOUT SERIOUS COMORBIDITY WITH BODY MASS INDEX (BMI) OF 36.0 TO 36.9 IN ADULT: Status: RESOLVED | Noted: 2022-08-16 | Resolved: 2024-04-17

## 2024-04-17 PROBLEM — E66.813 CLASS 3 SEVERE OBESITY DUE TO EXCESS CALORIES WITHOUT SERIOUS COMORBIDITY WITH BODY MASS INDEX (BMI) OF 40.0 TO 44.9 IN ADULT: Status: ACTIVE | Noted: 2023-10-17

## 2024-04-17 PROBLEM — E66.812 CLASS 2 OBESITY DUE TO EXCESS CALORIES WITHOUT SERIOUS COMORBIDITY WITH BODY MASS INDEX (BMI) OF 36.0 TO 36.9 IN ADULT: Status: RESOLVED | Noted: 2022-08-16 | Resolved: 2024-04-17

## 2024-04-17 RX ORDER — DICYCLOMINE HYDROCHLORIDE 10 MG/1
10 CAPSULE ORAL 3 TIMES DAILY PRN
Start: 2024-04-17

## 2024-04-17 RX ORDER — ESCITALOPRAM OXALATE 20 MG/1
20 TABLET ORAL DAILY
Qty: 90 TABLET | Refills: 1 | Status: SHIPPED | OUTPATIENT
Start: 2024-04-17

## 2024-04-17 RX ORDER — BUPROPION HYDROCHLORIDE 150 MG/1
150 TABLET ORAL EVERY MORNING
Qty: 90 TABLET | Refills: 1 | Status: SHIPPED | OUTPATIENT
Start: 2024-04-17 | End: 2024-04-18 | Stop reason: SDUPTHER

## 2024-04-17 RX ORDER — CETIRIZINE HYDROCHLORIDE 10 MG/1
10 CAPSULE, LIQUID FILLED ORAL EVERY 24 HOURS
Qty: 90 CAPSULE | Refills: 1 | Status: SHIPPED | OUTPATIENT
Start: 2024-04-17

## 2024-04-17 RX ORDER — LANSOPRAZOLE 30 MG/1
30 CAPSULE, DELAYED RELEASE ORAL DAILY
Qty: 90 CAPSULE | Refills: 1 | Status: SHIPPED | OUTPATIENT
Start: 2024-04-17

## 2024-04-17 NOTE — ASSESSMENT & PLAN NOTE
Doing well with Lexapro for anxiety control and IBS.  He does not need refills on dicyclomine given he uses this infrequently but will let us know if and when he needs refills

## 2024-04-17 NOTE — ASSESSMENT & PLAN NOTE
Patient's depression is a recurrent episode that is mild without psychosis. Depression is in full remission and improving with treatment.    Plan:   Continue current medication therapy     Followup at the next regular appointment.

## 2024-04-17 NOTE — ASSESSMENT & PLAN NOTE
Patient's (Body mass index is 40.95 kg/m².) indicates that they are obese (BMI >30) with health conditions that include GERD . Weight is improving with lifestyle modifications. BMI  is above average; BMI management plan is completed. We discussed portion control and increasing exercise.

## 2024-04-17 NOTE — ASSESSMENT & PLAN NOTE
Encouraged diet and exercise efforts.  We did discuss weight gain today and working on portion control and I did recommend weight watchers or Noom as well as continued exercise efforts

## 2024-04-17 NOTE — PROGRESS NOTES
"Chief Complaint  Physical exam/fasting labs and followup for Anxiety/Depression, GERD, IBS, Hyperglycemia, Allergies    Subjective    History of Present Illness:  Armando Mcconnell is a 28 y.o. male who presents today for physical exam and fasting labs along with followup visit and medication refills.    Fasting blood work up-to-date in August with mild blood sugar and triglyceride elevation.  Encourage diet and exercise efforts given family history of diabetes in both parents.  He did have coffee with creamer and sugar today but would like labs done.  He is struggled with appetite increase since our last visit and has gained 38 pounds.  The only medication changes been addition of Wellbutrin XL and we discussed this usually would be more of an appetite suppression and not contribute to weight gain.  We did discuss adjustment to 300 mg to help more with appetite suppression and he will consider after his labs return.  No past history of thyroid problems.    Anxiety and IBS symptoms remain manageable with Lexapro at 20 mg dosing.  He has been able to effectively get off dicyclomine given improvement in IBS with Lexapro.  Anxiety is well controlled.  He is using dicyclomine as needed but does not need any refills today.    We did add in wellbutrin given flareups with depression when they lost their family dog last year and this is working well with lexapro for flareup of reactive depression    GERD stable with Prevacid.  No dysphagia.    Recurrent problems with seasonal and skin allergy stable with combination of Zyrtec and Flonase.  If he misses a dose of Zyrtec he does tend to have some scalp itching.  Discussed higher dosing of Zyrtec can be helpful for hives he can go up to 20 mg daily if needed.        Objective   Vital Signs:   /78   Pulse 76   Resp 20   Ht 182.9 cm (72.01\")   Wt (!) 137 kg (302 lb)   SpO2 98%   BMI 40.95 kg/m²     Review of Systems   Constitutional:  Positive for appetite " change. Negative for chills and fever.   HENT:  Negative for hearing loss.    Eyes:  Negative for blurred vision.   Respiratory:  Negative for chest tightness.    Cardiovascular:  Negative for chest pain.   Gastrointestinal:  Negative for abdominal pain.   Musculoskeletal:  Negative for gait problem.   Skin:  Negative for rash.   Psychiatric/Behavioral:  Negative for depressed mood.        Past History:  Medical History: has a past medical history of Allergic, Depression, Exercise-induced asthma, GERD (gastroesophageal reflux disease), and Pharyngitis.   Surgical History: has no past surgical history on file.   Family History: family history includes Migraines in his mother.   Social History: reports that he has never smoked. He has never been exposed to tobacco smoke. He has never used smokeless tobacco. He reports that he does not currently use alcohol. He reports that he does not use drugs.      Current Outpatient Medications:     buPROPion XL (WELLBUTRIN XL) 150 MG 24 hr tablet, Take 1 tablet by mouth Every Morning., Disp: 90 tablet, Rfl: 1    Cetirizine HCl (ZyrTEC Allergy) 10 MG capsule, Take 10 mg by mouth Daily., Disp: 90 capsule, Rfl: 1    escitalopram (LEXAPRO) 20 MG tablet, Take 1 tablet by mouth Daily., Disp: 90 tablet, Rfl: 1    fluticasone (FLONASE) 50 MCG/ACT nasal spray, 2 sprays into the nostril(s) as directed by provider Daily., Disp: 48 g, Rfl: 1    lansoprazole (PREVACID) 30 MG capsule, Take 1 capsule by mouth Daily., Disp: 90 capsule, Rfl: 1    dicyclomine (BENTYL) 10 MG capsule, Take 1 capsule by mouth 3 (Three) Times a Day As Needed for Abdominal Cramping., Disp: , Rfl:     Allergies: Ibuprofen    Physical Exam  Constitutional:       Appearance: He is obese.   HENT:      Head: Normocephalic.      Right Ear: External ear normal.      Left Ear: External ear normal.      Nose: Nose normal.      Mouth/Throat:      Mouth: Mucous membranes are moist.      Pharynx: Oropharynx is clear.   Eyes:       Extraocular Movements: Extraocular movements intact.      Conjunctiva/sclera: Conjunctivae normal.      Pupils: Pupils are equal, round, and reactive to light.   Cardiovascular:      Rate and Rhythm: Normal rate and regular rhythm.      Heart sounds: Normal heart sounds.   Pulmonary:      Effort: Pulmonary effort is normal.      Breath sounds: Normal breath sounds.   Musculoskeletal:         General: Normal range of motion.      Cervical back: Normal range of motion. No rigidity or tenderness.   Skin:     General: Skin is warm and dry.   Neurological:      General: No focal deficit present.      Mental Status: He is alert.   Psychiatric:         Mood and Affect: Mood normal.         Behavior: Behavior normal.         Thought Content: Thought content normal.          Result Review                   Assessment and Plan  Diagnoses and all orders for this visit:    1. General medical exam (Primary)  Assessment & Plan:  Discussed together health maintenance and screening along with vaccination options and healthy diet and exercise habits as part of the preventative counseling at their physical exam today.     Orders:  -     CBC Auto Differential; Future  -     Comprehensive Metabolic Panel; Future  -     Lipid Panel; Future  -     TSH; Future  -     T4, Free; Future  -     CBC Auto Differential  -     Comprehensive Metabolic Panel  -     Lipid Panel  -     TSH  -     T4, Free    2. Diabetes mellitus screening  -     Hemoglobin A1c; Future  -     Hemoglobin A1c    3. Reactive depression  Assessment & Plan:  Patient's depression is a recurrent episode that is mild without psychosis. Depression is in full remission and improving with treatment.    Plan:   Continue current medication therapy     Followup at the next regular appointment.     Orders:  -     buPROPion XL (WELLBUTRIN XL) 150 MG 24 hr tablet; Take 1 tablet by mouth Every Morning.  Dispense: 90 tablet; Refill: 1    4. Generalized anxiety disorder with panic  attacks  Assessment & Plan:  Psychological condition is improving with treatment.  Continue current treatment regimen.  Psychological condition  will be reassessed at the next regular appointment.    Orders:  -     escitalopram (LEXAPRO) 20 MG tablet; Take 1 tablet by mouth Daily.  Dispense: 90 tablet; Refill: 1    5. Irritable bowel syndrome with diarrhea  Assessment & Plan:  Doing well with Lexapro for anxiety control and IBS.  He does not need refills on dicyclomine given he uses this infrequently but will let us know if and when he needs refills    Orders:  -     dicyclomine (BENTYL) 10 MG capsule; Take 1 capsule by mouth 3 (Three) Times a Day As Needed for Abdominal Cramping.    6. GERD without esophagitis  Assessment & Plan:  Continue Prevacid    Orders:  -     lansoprazole (PREVACID) 30 MG capsule; Take 1 capsule by mouth Daily.  Dispense: 90 capsule; Refill: 1    7. Seasonal allergic rhinitis due to pollen  Assessment & Plan:  Continue with Zyrtec and Flonase.  Discussed ability to go up on Zyrtec dose to 20 mg for skin eruption/hives if needed.    Orders:  -     Cetirizine HCl (ZyrTEC Allergy) 10 MG capsule; Take 10 mg by mouth Daily.  Dispense: 90 capsule; Refill: 1    8. Hyperglycemia  Assessment & Plan:  Encouraged diet and exercise efforts.  We did discuss weight gain today and working on portion control and I did recommend weight watchers or Noom as well as continued exercise efforts    Orders:  -     Hemoglobin A1c; Future  -     Hemoglobin A1c    9. Class 3 severe obesity due to excess calories without serious comorbidity with body mass index (BMI) of 40.0 to 44.9 in adult  Assessment & Plan:  Patient's (Body mass index is 40.95 kg/m².) indicates that they are obese (BMI >30) with health conditions that include GERD . Weight is improving with lifestyle modifications. BMI  is above average; BMI management plan is completed. We discussed portion control and increasing exercise.                       Follow Up  Return in about 6 months (around 10/17/2024) for Med recheck.    Clay Kwon MD

## 2024-04-18 ENCOUNTER — PATIENT MESSAGE (OUTPATIENT)
Dept: FAMILY MEDICINE CLINIC | Facility: CLINIC | Age: 29
End: 2024-04-18
Payer: COMMERCIAL

## 2024-04-18 DIAGNOSIS — F32.9 REACTIVE DEPRESSION: ICD-10-CM

## 2024-04-18 LAB
BASOPHILS # BLD AUTO: 0.1 X10E3/UL (ref 0–0.2)
BASOPHILS NFR BLD AUTO: 1 %
EOSINOPHIL # BLD AUTO: 0.1 X10E3/UL (ref 0–0.4)
EOSINOPHIL NFR BLD AUTO: 2 %
ERYTHROCYTE [DISTWIDTH] IN BLOOD BY AUTOMATED COUNT: 12.8 % (ref 11.6–15.4)
HBA1C MFR BLD: 5.7 % (ref 4.8–5.6)
HCT VFR BLD AUTO: 44.3 % (ref 37.5–51)
HGB BLD-MCNC: 14.9 G/DL (ref 13–17.7)
IMM GRANULOCYTES # BLD AUTO: 0 X10E3/UL (ref 0–0.1)
IMM GRANULOCYTES NFR BLD AUTO: 0 %
LYMPHOCYTES # BLD AUTO: 2 X10E3/UL (ref 0.7–3.1)
LYMPHOCYTES NFR BLD AUTO: 33 %
MCH RBC QN AUTO: 31 PG (ref 26.6–33)
MCHC RBC AUTO-ENTMCNC: 33.6 G/DL (ref 31.5–35.7)
MCV RBC AUTO: 92 FL (ref 79–97)
MONOCYTES # BLD AUTO: 0.4 X10E3/UL (ref 0.1–0.9)
MONOCYTES NFR BLD AUTO: 7 %
NEUTROPHILS # BLD AUTO: 3.5 X10E3/UL (ref 1.4–7)
NEUTROPHILS NFR BLD AUTO: 57 %
PLATELET # BLD AUTO: 303 X10E3/UL (ref 150–450)
RBC # BLD AUTO: 4.8 X10E6/UL (ref 4.14–5.8)
T4 FREE SERPL-MCNC: 1.25 NG/DL (ref 0.82–1.77)
TSH SERPL DL<=0.005 MIU/L-ACNC: 2.5 UIU/ML (ref 0.45–4.5)
WBC # BLD AUTO: 6 X10E3/UL (ref 3.4–10.8)

## 2024-04-18 RX ORDER — BUPROPION HYDROCHLORIDE 300 MG/1
300 TABLET ORAL EVERY MORNING
Qty: 90 TABLET | Refills: 2 | Status: SHIPPED | OUTPATIENT
Start: 2024-04-18

## 2024-04-18 NOTE — TELEPHONE ENCOUNTER
From: Armando Mcconnell  To: Clay Kwon  Sent: 4/18/2024 2:36 PM EDT  Subject: Medicine     Since the blood work cam back good, will we be updating the wellbutrin to 300mg?

## 2024-04-19 LAB
ALBUMIN SERPL-MCNC: 4.8 G/DL (ref 4.3–5.2)
ALBUMIN/GLOB SERPL: 2.3 {RATIO} (ref 1.2–2.2)
ALP SERPL-CCNC: 56 IU/L (ref 44–121)
ALT SERPL-CCNC: 25 IU/L (ref 0–44)
AST SERPL-CCNC: 22 IU/L (ref 0–40)
BILIRUB SERPL-MCNC: <0.2 MG/DL (ref 0–1.2)
BUN SERPL-MCNC: 15 MG/DL (ref 6–20)
BUN/CREAT SERPL: 16 (ref 9–20)
CALCIUM SERPL-MCNC: 9.8 MG/DL (ref 8.7–10.2)
CHLORIDE SERPL-SCNC: 101 MMOL/L (ref 96–106)
CHOLEST SERPL-MCNC: 202 MG/DL (ref 100–199)
CO2 SERPL-SCNC: 25 MMOL/L (ref 20–29)
CREAT SERPL-MCNC: 0.95 MG/DL (ref 0.76–1.27)
EGFRCR SERPLBLD CKD-EPI 2021: 113 ML/MIN/1.73
GLOBULIN SER CALC-MCNC: 2.1 G/DL (ref 1.5–4.5)
GLUCOSE SERPL-MCNC: 81 MG/DL (ref 70–99)
HDLC SERPL-MCNC: 56 MG/DL
LDLC SERPL CALC-MCNC: 132 MG/DL (ref 0–99)
POTASSIUM SERPL-SCNC: 5.1 MMOL/L (ref 3.5–5.2)
PROT SERPL-MCNC: 6.9 G/DL (ref 6–8.5)
SODIUM SERPL-SCNC: 140 MMOL/L (ref 134–144)
TRIGL SERPL-MCNC: 75 MG/DL (ref 0–149)
VLDLC SERPL CALC-MCNC: 14 MG/DL (ref 5–40)

## 2024-07-18 ENCOUNTER — TELEPHONE (OUTPATIENT)
Dept: FAMILY MEDICINE CLINIC | Facility: CLINIC | Age: 29
End: 2024-07-18
Payer: COMMERCIAL

## 2024-07-18 NOTE — TELEPHONE ENCOUNTER
----- Message from [facility] [employer] sent at 7/17/2024  8:23 PM EDT -----  Regarding: Appointment Request  Contact: [phone]  Appointment Request From: Armando Mcconnell    With Provider: Clay Kwon [[facility]]    Preferred Date Range: 7/18/2024 - 7/31/2024    Preferred Times: Any Time    Reason for visit: Medicare Wellness Visit - Initial    Comments:  Been having some pain in my knees and feet since I played basketball about a month ago. Thought I was just sore for a bit but it doesn't seem to go away        GOT PATIENT SET UP WITH DR. ESQUIVEL FOR NEXT WEEK

## 2024-07-23 ENCOUNTER — OFFICE VISIT (OUTPATIENT)
Dept: FAMILY MEDICINE CLINIC | Facility: CLINIC | Age: 29
End: 2024-07-23
Payer: COMMERCIAL

## 2024-07-23 VITALS
SYSTOLIC BLOOD PRESSURE: 130 MMHG | DIASTOLIC BLOOD PRESSURE: 86 MMHG | HEIGHT: 72 IN | OXYGEN SATURATION: 97 % | BODY MASS INDEX: 40.8 KG/M2 | WEIGHT: 301.2 LBS | HEART RATE: 81 BPM

## 2024-07-23 DIAGNOSIS — S89.91XA INJURY OF RIGHT KNEE, INITIAL ENCOUNTER: Primary | ICD-10-CM

## 2024-07-23 DIAGNOSIS — M77.50 TENDONITIS OF ANKLE OR FOOT: ICD-10-CM

## 2024-07-23 DIAGNOSIS — M76.51 PATELLAR TENDINITIS OF RIGHT KNEE: ICD-10-CM

## 2024-07-23 DIAGNOSIS — M22.2X9 PATELLOFEMORAL STRESS SYNDROME, UNSPECIFIED LATERALITY: ICD-10-CM

## 2024-07-23 PROCEDURE — 99213 OFFICE O/P EST LOW 20 MIN: CPT | Performed by: FAMILY MEDICINE

## 2024-07-23 RX ORDER — PREDNISONE 10 MG/1
TABLET ORAL
Qty: 21 TABLET | Refills: 0 | Status: SHIPPED | OUTPATIENT
Start: 2024-07-23

## 2024-07-23 NOTE — PROGRESS NOTES
Follow Up Office Visit      Patient Name: Armando Mcconnell  : 1995   MRN: [id number]     Chief Complaint:    Chief Complaint   Patient presents with   • Knee Pain     Pain in right knee when squatting or putting weight on knees when working    • Foot Injury     Pain and stiffness after sitting or laying down        History of Present Illness: Armando Mcconnell is a 29 y.o. male who is here today for follow up with complaint of right knee pain and bilateral dorsal foot pain after playing basketball few weeks ago.  Patient denies specific injury.  He states both feet and knee are painful with squatting or standing from a sitting position.  Patient has not tried any treatment or medication for this.    Answers submitted by the patient for this visit:  Primary Reason for Visit (Submitted on 2024)  What is the primary reason for your visit?: Other  Other (Submitted on 2024)  Please describe your symptoms.: Knee and feet pain  Have you had these symptoms before?: No  How long have you been having these symptoms?: Greater than 2 weeks  Please describe any probable cause for these symptoms. : Basketball, weight lifting      Subjective      Review of Systems:   Review of Systems   Musculoskeletal:  Positive for arthralgias. Negative for gait problem and joint swelling.       The following portions of the patient's history were reviewed and updated as appropriate: allergies, current medications, past family history, past medical history, past social history, past surgical history and problem list.    Medications:     Current Outpatient Medications:   •  buPROPion XL (WELLBUTRIN XL) 300 MG 24 hr tablet, Take 1 tablet by mouth Every Morning., Disp: 90 tablet, Rfl: 2  •  Cetirizine HCl (ZyrTEC Allergy) 10 MG capsule, Take 10 mg by mouth Daily., Disp: 90 capsule, Rfl: 1  •  dicyclomine (BENTYL) 10 MG capsule, Take 1 capsule by mouth 3 (Three) Times a Day As Needed for Abdominal Cramping., Disp: ,  "Rfl:   •  escitalopram (LEXAPRO) 20 MG tablet, Take 1 tablet by mouth Daily., Disp: 90 tablet, Rfl: 1  •  fluticasone (FLONASE) 50 MCG/ACT nasal spray, 2 sprays into the nostril(s) as directed by provider Daily., Disp: 48 g, Rfl: 1  •  lansoprazole (PREVACID) 30 MG capsule, Take 1 capsule by mouth Daily., Disp: 90 capsule, Rfl: 1  •  predniSONE (DELTASONE) 10 MG (21) dose pack, Use as directed on package, Disp: 21 tablet, Rfl: 0    Allergies:   Allergies   Allergen Reactions   • Ibuprofen Rash       Objective     Physical Exam:  Vital Signs:   Vitals:    07/23/24 1143   BP: 130/86   BP Location: Left arm   Patient Position: Sitting   Cuff Size: Adult   Pulse: 81   SpO2: 97%   Weight: (!) 137 kg (301 lb 3.2 oz)   Height: 182.9 cm (72.01\")     Body mass index is 40.84 kg/m².   Facility age limit for growth %roshan is 20 years.    Physical Exam  Vitals and nursing note reviewed.   Constitutional:       General: He is not in acute distress.     Appearance: Normal appearance. He is not ill-appearing or toxic-appearing.   Musculoskeletal:        Legs:       Comments: Pain in the area described on the diagram of the knee.  No tenderness noted over the same area.    No tenderness of the feet however there is pain with dorsiflexion in the area shown on the diagram.   Neurological:      Mental Status: He is alert.         Procedures    PHQ-9 Total Score:       Assessment / Plan      Assessment/Plan:   Assessment & Plan  Injury of right knee, initial encounter  Will go ahead and x-ray the knee today and recommend a patella stabilizing knee brace.  I suspect patella tendinitis.  Will place on a steroid pack.  If symptoms persist consider referral to orthopedics.  Patellar tendinitis of right knee  As above.  Tendonitis of ankle or foot  Will place on steroid pack today.  Consider referral if symptoms persist.    Orders Placed This Encounter   Procedures   • XR Knee 3 View Right     New Medications Ordered This Visit   Medications "   • predniSONE (DELTASONE) 10 MG (21) dose pack     Sig: Use as directed on package     Dispense:  21 tablet     Refill:  0                   Follow Up:   No follow-ups on file.      FILIBERTO Pina MD  [facility] Aarti Mahoney

## 2024-08-09 ENCOUNTER — PATIENT MESSAGE (OUTPATIENT)
Dept: FAMILY MEDICINE CLINIC | Facility: CLINIC | Age: 29
End: 2024-08-09
Payer: COMMERCIAL

## 2024-08-09 DIAGNOSIS — K58.0 IRRITABLE BOWEL SYNDROME WITH DIARRHEA: ICD-10-CM

## 2024-08-09 DIAGNOSIS — F41.1 GENERALIZED ANXIETY DISORDER WITH PANIC ATTACKS: Chronic | ICD-10-CM

## 2024-08-09 DIAGNOSIS — K21.9 GERD WITHOUT ESOPHAGITIS: Chronic | ICD-10-CM

## 2024-08-09 DIAGNOSIS — F41.0 GENERALIZED ANXIETY DISORDER WITH PANIC ATTACKS: Chronic | ICD-10-CM

## 2024-08-09 DIAGNOSIS — J30.1 SEASONAL ALLERGIC RHINITIS DUE TO POLLEN: Chronic | ICD-10-CM

## 2024-08-09 DIAGNOSIS — F32.9 REACTIVE DEPRESSION: ICD-10-CM

## 2024-08-09 RX ORDER — LANSOPRAZOLE 30 MG/1
30 CAPSULE, DELAYED RELEASE ORAL DAILY
Qty: 90 CAPSULE | Refills: 1 | Status: SHIPPED | OUTPATIENT
Start: 2024-08-09 | End: 2024-08-12 | Stop reason: SDUPTHER

## 2024-08-09 RX ORDER — FLUTICASONE PROPIONATE 50 MCG
2 SPRAY, SUSPENSION (ML) NASAL DAILY
Qty: 48 G | Refills: 1 | Status: SHIPPED | OUTPATIENT
Start: 2024-08-09 | End: 2024-08-12 | Stop reason: SDUPTHER

## 2024-08-09 RX ORDER — ESCITALOPRAM OXALATE 20 MG/1
20 TABLET ORAL DAILY
Qty: 90 TABLET | Refills: 1 | Status: SHIPPED | OUTPATIENT
Start: 2024-08-09 | End: 2024-08-12 | Stop reason: SDUPTHER

## 2024-08-09 RX ORDER — DICYCLOMINE HYDROCHLORIDE 10 MG/1
10 CAPSULE ORAL 3 TIMES DAILY PRN
Start: 2024-08-09 | End: 2024-08-12 | Stop reason: SDUPTHER

## 2024-08-09 RX ORDER — BUPROPION HYDROCHLORIDE 300 MG/1
300 TABLET ORAL EVERY MORNING
Qty: 90 TABLET | Refills: 2 | Status: SHIPPED | OUTPATIENT
Start: 2024-08-09 | End: 2024-08-12 | Stop reason: SDUPTHER

## 2024-08-09 RX ORDER — CETIRIZINE HYDROCHLORIDE 10 MG/1
10 CAPSULE, LIQUID FILLED ORAL EVERY 24 HOURS
Qty: 90 CAPSULE | Refills: 1 | Status: SHIPPED | OUTPATIENT
Start: 2024-08-09 | End: 2024-08-12 | Stop reason: SDUPTHER

## 2024-08-12 RX ORDER — DICYCLOMINE HYDROCHLORIDE 10 MG/1
10 CAPSULE ORAL 3 TIMES DAILY PRN
Qty: 90 CAPSULE | Refills: 1 | Status: SHIPPED | OUTPATIENT
Start: 2024-08-12

## 2024-08-12 RX ORDER — ESCITALOPRAM OXALATE 20 MG/1
20 TABLET ORAL DAILY
Qty: 90 TABLET | Refills: 1 | Status: SHIPPED | OUTPATIENT
Start: 2024-08-12

## 2024-08-12 RX ORDER — LANSOPRAZOLE 30 MG/1
30 CAPSULE, DELAYED RELEASE ORAL DAILY
Qty: 90 CAPSULE | Refills: 1 | Status: SHIPPED | OUTPATIENT
Start: 2024-08-12

## 2024-08-12 RX ORDER — CETIRIZINE HYDROCHLORIDE 10 MG/1
10 CAPSULE, LIQUID FILLED ORAL EVERY 24 HOURS
Qty: 90 CAPSULE | Refills: 1 | Status: SHIPPED | OUTPATIENT
Start: 2024-08-12

## 2024-08-12 RX ORDER — BUPROPION HYDROCHLORIDE 300 MG/1
300 TABLET ORAL EVERY MORNING
Qty: 90 TABLET | Refills: 2 | Status: SHIPPED | OUTPATIENT
Start: 2024-08-12

## 2024-08-12 RX ORDER — FLUTICASONE PROPIONATE 50 MCG
2 SPRAY, SUSPENSION (ML) NASAL DAILY
Qty: 48 G | Refills: 1 | Status: SHIPPED | OUTPATIENT
Start: 2024-08-12

## 2024-08-12 NOTE — TELEPHONE ENCOUNTER
From: Armando Mcconnell  To: Clay Kwon  Sent: 8/9/2024 12:39 PM EDT  Subject: Medication refills    Hi Dr. Kwon,    I just accepted a new job position and will be losing my insurance for 90 days. I was seeing I'd you could refill my prescriptions will I am covered and will have them through the 90 days.    Thanks,   Gino

## 2024-09-27 ENCOUNTER — PATIENT MESSAGE (OUTPATIENT)
Dept: FAMILY MEDICINE CLINIC | Facility: CLINIC | Age: 29
End: 2024-09-27
Payer: COMMERCIAL

## 2024-10-17 ENCOUNTER — OFFICE VISIT (OUTPATIENT)
Dept: FAMILY MEDICINE CLINIC | Facility: CLINIC | Age: 29
End: 2024-10-17

## 2024-10-17 VITALS
HEIGHT: 72 IN | BODY MASS INDEX: 42.53 KG/M2 | WEIGHT: 314 LBS | HEART RATE: 100 BPM | SYSTOLIC BLOOD PRESSURE: 124 MMHG | OXYGEN SATURATION: 96 % | DIASTOLIC BLOOD PRESSURE: 82 MMHG

## 2024-10-17 DIAGNOSIS — K58.0 IRRITABLE BOWEL SYNDROME WITH DIARRHEA: Primary | ICD-10-CM

## 2024-10-17 DIAGNOSIS — E66.01 CLASS 3 SEVERE OBESITY DUE TO EXCESS CALORIES WITHOUT SERIOUS COMORBIDITY WITH BODY MASS INDEX (BMI) OF 40.0 TO 44.9 IN ADULT: ICD-10-CM

## 2024-10-17 DIAGNOSIS — F41.0 GENERALIZED ANXIETY DISORDER WITH PANIC ATTACKS: ICD-10-CM

## 2024-10-17 DIAGNOSIS — F32.9 REACTIVE DEPRESSION: ICD-10-CM

## 2024-10-17 DIAGNOSIS — F41.1 GENERALIZED ANXIETY DISORDER WITH PANIC ATTACKS: ICD-10-CM

## 2024-10-17 DIAGNOSIS — E66.813 CLASS 3 SEVERE OBESITY DUE TO EXCESS CALORIES WITHOUT SERIOUS COMORBIDITY WITH BODY MASS INDEX (BMI) OF 40.0 TO 44.9 IN ADULT: ICD-10-CM

## 2024-10-17 DIAGNOSIS — J30.1 SEASONAL ALLERGIC RHINITIS DUE TO POLLEN: ICD-10-CM

## 2024-10-17 DIAGNOSIS — K21.9 GERD WITHOUT ESOPHAGITIS: ICD-10-CM

## 2024-10-17 DIAGNOSIS — R73.9 HYPERGLYCEMIA: ICD-10-CM

## 2024-10-17 DIAGNOSIS — Z23 NEEDS FLU SHOT: ICD-10-CM

## 2024-10-17 PROCEDURE — 90656 IIV3 VACC NO PRSV 0.5 ML IM: CPT | Performed by: FAMILY MEDICINE

## 2024-10-17 PROCEDURE — 90471 IMMUNIZATION ADMIN: CPT | Performed by: FAMILY MEDICINE

## 2024-10-17 PROCEDURE — 99214 OFFICE O/P EST MOD 30 MIN: CPT | Performed by: FAMILY MEDICINE

## 2024-10-17 RX ORDER — FLUTICASONE PROPIONATE 50 UG/1
2 SPRAY, METERED NASAL DAILY
Qty: 48 G | Refills: 2 | Status: SHIPPED | OUTPATIENT
Start: 2024-10-17

## 2024-10-17 RX ORDER — CETIRIZINE HYDROCHLORIDE 10 MG/1
10 CAPSULE, LIQUID FILLED ORAL EVERY 24 HOURS
Qty: 90 CAPSULE | Refills: 2 | Status: SHIPPED | OUTPATIENT
Start: 2024-10-17

## 2024-10-17 RX ORDER — ESCITALOPRAM OXALATE 20 MG/1
20 TABLET ORAL DAILY
Qty: 90 TABLET | Refills: 2 | Status: SHIPPED | OUTPATIENT
Start: 2024-10-17

## 2024-10-17 RX ORDER — LANSOPRAZOLE 30 MG/1
30 CAPSULE, DELAYED RELEASE ORAL DAILY
Qty: 90 CAPSULE | Refills: 2 | Status: SHIPPED | OUTPATIENT
Start: 2024-10-17

## 2024-10-17 RX ORDER — BUPROPION HYDROCHLORIDE 300 MG/1
300 TABLET ORAL EVERY MORNING
Qty: 90 TABLET | Refills: 2 | Status: SHIPPED | OUTPATIENT
Start: 2024-10-17

## 2024-10-17 NOTE — PROGRESS NOTES
"Chief Complaint  Anxiety/Depression, GERD, IBS, Hyperglycemia, Allergies    Subjective    History of Present Illness:  Armando Mcconnell is a 29 y.o. male who presents today for followup visit and medication refills.    Doing well since last visit.  Working full-time at [employer].  Continues with [employer] part-time.  Has noticed more headaches with computer work - plans to get eye checkup done given it has been years since last eye exam     Fasting blood work up-to-date in April 2024 with mild A1c elevation at 5.7.  Encourage diet and exercise efforts given family history of diabetes in both parents.      Anxiety and IBS symptoms remain manageable with Lexapro at 20 mg dosing.  He has been able to effectively get off dicyclomine given improvement in IBS with Lexapro - but does have this to use for flareups if needed.  Anxiety is well controlled.     We did add in wellbutrin given flareups with depression when they lost their family dog last year and this is working well with lexapro for flareup of reactive depression    GERD stable with Prevacid.  No dysphagia.    Recurrent problems with seasonal and skin allergy stable with combination of Zyrtec and Flonase.  If he misses a dose of Zyrtec he does tend to have some scalp itching.  Discussed higher dosing of Zyrtec can be helpful for hives he can go up to 20 mg daily if needed.    Ready for flu shot today     Objective   Vital Signs:   /82   Pulse 100   Ht 182.9 cm (72\")   Wt (!) 142 kg (314 lb)   SpO2 96%   BMI 42.59 kg/m²     Review of Systems   Constitutional:  Negative for appetite change, chills and fever.   HENT:  Negative for hearing loss.    Eyes:  Negative for blurred vision.   Respiratory:  Negative for chest tightness.    Cardiovascular:  Negative for chest pain.   Gastrointestinal:  Negative for abdominal pain.   Musculoskeletal:  Negative for gait problem.   Skin:  Negative for rash.   Neurological:  Positive for headache.        Occ " headaches with more computer use at work - years since last eye exam - will plan to get eye checkup scheduled.    Psychiatric/Behavioral:  Negative for depressed mood.        Past History:  Medical History: has a past medical history of Allergic, Depression, Exercise-induced asthma, GERD (gastroesophageal reflux disease), and Pharyngitis.   Surgical History: has no past surgical history on file.   Family History: family history includes Migraines in his mother.   Social History: reports that he has never smoked. He has never been exposed to tobacco smoke. He has never used smokeless tobacco. He reports that he does not currently use alcohol. He reports that he does not use drugs.      Current Outpatient Medications:     buPROPion XL (WELLBUTRIN XL) 300 MG 24 hr tablet, Take 1 tablet by mouth Every Morning., Disp: 90 tablet, Rfl: 2    Cetirizine HCl (ZyrTEC Allergy) 10 MG capsule, Take 10 mg by mouth Daily., Disp: 90 capsule, Rfl: 2    dicyclomine (BENTYL) 10 MG capsule, Take 1 capsule by mouth 3 (Three) Times a Day As Needed for Abdominal Cramping., Disp: 90 capsule, Rfl: 1    escitalopram (LEXAPRO) 20 MG tablet, Take 1 tablet by mouth Daily., Disp: 90 tablet, Rfl: 2    fluticasone (FLONASE) 50 MCG/ACT nasal spray, Administer 2 sprays into the nostril(s) as directed by provider Daily., Disp: 48 g, Rfl: 2    lansoprazole (PREVACID) 30 MG capsule, Take 1 capsule by mouth Daily., Disp: 90 capsule, Rfl: 2    Allergies: Ibuprofen    Physical Exam  Constitutional:       Appearance: He is obese.   HENT:      Head: Normocephalic.      Right Ear: External ear normal.      Left Ear: External ear normal.      Nose: Nose normal.   Eyes:      Pupils: Pupils are equal, round, and reactive to light.   Cardiovascular:      Rate and Rhythm: Normal rate and regular rhythm.      Heart sounds: Normal heart sounds.   Pulmonary:      Effort: Pulmonary effort is normal.      Breath sounds: Normal breath sounds.   Musculoskeletal:          General: Normal range of motion.      Cervical back: Normal range of motion.   Skin:     General: Skin is warm and dry.   Neurological:      General: No focal deficit present.      Mental Status: He is alert.   Psychiatric:         Mood and Affect: Mood normal.         Behavior: Behavior normal.         Thought Content: Thought content normal.          Result Review                   Assessment and Plan  Diagnoses and all orders for this visit:    1. Irritable bowel syndrome with diarrhea (Primary)  Assessment & Plan:  Doing well with Lexapro for anxiety control and IBS.  He does not need refills on dicyclomine given he uses this infrequently but will let us know if and when he needs refills      2. Seasonal allergic rhinitis due to pollen  Assessment & Plan:  Continue with Zyrtec and Flonase.  Discussed ability to go up on Zyrtec dose to 20 mg for skin eruption/hives if needed.    Orders:  -     Cetirizine HCl (ZyrTEC Allergy) 10 MG capsule; Take 10 mg by mouth Daily.  Dispense: 90 capsule; Refill: 2  -     fluticasone (FLONASE) 50 MCG/ACT nasal spray; Administer 2 sprays into the nostril(s) as directed by provider Daily.  Dispense: 48 g; Refill: 2    3. Generalized anxiety disorder with panic attacks  Assessment & Plan:  Psychological condition is improving with treatment.  Continue current treatment regimen.  Psychological condition  will be reassessed at the next regular appointment.    Orders:  -     escitalopram (LEXAPRO) 20 MG tablet; Take 1 tablet by mouth Daily.  Dispense: 90 tablet; Refill: 2    4. GERD without esophagitis  Assessment & Plan:  Continue Prevacid    Orders:  -     lansoprazole (PREVACID) 30 MG capsule; Take 1 capsule by mouth Daily.  Dispense: 90 capsule; Refill: 2    5. Reactive depression  Assessment & Plan:  Patient's depression is a recurrent episode that is mild without psychosis. Depression is in full remission and improving with treatment.    Plan:   Continue current medication  therapy     Followup at the next regular appointment.     Orders:  -     buPROPion XL (WELLBUTRIN XL) 300 MG 24 hr tablet; Take 1 tablet by mouth Every Morning.  Dispense: 90 tablet; Refill: 2    6. Needs flu shot  -     Fluzone >6mos ([id number])    7. Hyperglycemia  Assessment & Plan:  Encouraged diet and exercise efforts.  We did discuss weight gain today and working on portion control and I did recommend weight watchers or Noom as well as continued exercise efforts      8. Class 3 severe obesity due to excess calories without serious comorbidity with body mass index (BMI) of 40.0 to 44.9 in adult  Assessment & Plan:  Patient's (Body mass index is 42.59 kg/m².) indicates that they are obese (BMI >30) with health conditions that include GERD . Weight is improving with lifestyle modifications. BMI  is above average; BMI management plan is completed. We discussed portion control and increasing exercise.                      Follow Up  Return for Annual physical.    Clay Kwon MD

## 2024-10-17 NOTE — ASSESSMENT & PLAN NOTE
Patient's (Body mass index is 42.59 kg/m².) indicates that they are obese (BMI >30) with health conditions that include GERD . Weight is improving with lifestyle modifications. BMI  is above average; BMI management plan is completed. We discussed portion control and increasing exercise.

## 2024-11-25 ENCOUNTER — TELEPHONE (OUTPATIENT)
Dept: FAMILY MEDICINE CLINIC | Facility: CLINIC | Age: 29
End: 2024-11-25
Payer: COMMERCIAL

## 2024-11-25 NOTE — TELEPHONE ENCOUNTER
Caller: Armando Mcconnell    Relationship: Self    Best call back number: [phone]     What does billing need from the patient:     PATIENT CALLED AND STATED WE BILLED HIM FOR VISIT ON 10/17/24 AND DIDN'T USE THE [employer] PPO PLAN. PLEASE RE BILL.    THANK YOU

## 2024-12-10 ENCOUNTER — OFFICE VISIT (OUTPATIENT)
Dept: FAMILY MEDICINE CLINIC | Facility: CLINIC | Age: 29
End: 2024-12-10
Payer: COMMERCIAL

## 2024-12-10 VITALS
BODY MASS INDEX: 42.66 KG/M2 | HEIGHT: 72 IN | HEART RATE: 75 BPM | TEMPERATURE: 98.4 F | SYSTOLIC BLOOD PRESSURE: 144 MMHG | DIASTOLIC BLOOD PRESSURE: 96 MMHG | WEIGHT: 315 LBS | OXYGEN SATURATION: 96 %

## 2024-12-10 DIAGNOSIS — H65.01 NON-RECURRENT ACUTE SEROUS OTITIS MEDIA OF RIGHT EAR: ICD-10-CM

## 2024-12-10 DIAGNOSIS — R03.0 ELEVATED BLOOD PRESSURE READING WITHOUT DIAGNOSIS OF HYPERTENSION: ICD-10-CM

## 2024-12-10 DIAGNOSIS — R05.1 ACUTE COUGH: Primary | ICD-10-CM

## 2024-12-10 LAB
EXPIRATION DATE: NORMAL
EXPIRATION DATE: NORMAL
FLUAV AG UPPER RESP QL IA.RAPID: NOT DETECTED
FLUBV AG UPPER RESP QL IA.RAPID: NOT DETECTED
INTERNAL CONTROL: NORMAL
INTERNAL CONTROL: NORMAL
Lab: NORMAL
Lab: NORMAL
S PYO AG THROAT QL: NEGATIVE
SARS-COV-2 AG UPPER RESP QL IA.RAPID: NOT DETECTED

## 2024-12-10 PROCEDURE — 87880 STREP A ASSAY W/OPTIC: CPT | Performed by: PHYSICIAN ASSISTANT

## 2024-12-10 PROCEDURE — 99213 OFFICE O/P EST LOW 20 MIN: CPT | Performed by: PHYSICIAN ASSISTANT

## 2024-12-10 PROCEDURE — 87428 SARSCOV & INF VIR A&B AG IA: CPT | Performed by: PHYSICIAN ASSISTANT

## 2024-12-10 RX ORDER — AMOXICILLIN 875 MG/1
875 TABLET, COATED ORAL 2 TIMES DAILY
Qty: 20 TABLET | Refills: 0 | Status: SHIPPED | OUTPATIENT
Start: 2024-12-10 | End: 2024-12-20

## 2024-12-10 NOTE — ASSESSMENT & PLAN NOTE
Amoxicillin will be prescribed to address the ear infection. He is advised to use Coricidin HBP for decongestion, as it is suitable for patients with high blood pressure.

## 2024-12-10 NOTE — PROGRESS NOTES
Office Note     Name: Armando Mcconnell    : 1995     MRN: [id number]     Chief Complaint  Sore Throat and Earache    Subjective   Patient or patient representative verbalized consent for the use of Ambient Listening during the visit with  Sonia White PA-C for chart documentation. 12/10/2024  13:20 EST      Armando Mcconnell is a 29 y.o. male.     The patient presents for evaluation of sinus congestion, sore throat, headache, and earache.    He has been experiencing symptoms since Saturday, including sinus congestion, sore throat, headache, and earache, predominantly on the right side, which radiates into his jaw. The onset of the earache was today. He reports no fever, body aches, or chills. His nasal symptoms are primarily characterized by congestion, with occasional rhinorrhea and postnasal drip. He experiences mild sneezing and pressure in his cheeks. His cough is nonproductive, and he reports no shortness of breath or chest tightness, but he has to breathe out of his mouth. Gastrointestinal symptoms such as nausea, vomiting, and diarrhea are absent. He has been managing her symptoms with DayQuil, last taken this morning. He has previously tolerated amoxicillin without any adverse reactions.    Her blood pressure was slightly elevated during her last visit, but it normalized upon rechecking with a larger cuff. She has a blood pressure monitor at home.    ALLERGIES  The patient has no known allergies.    MEDICATIONS  DayQuil    Review of Systems:   Review of Systems   Constitutional:  Negative for chills, fatigue and fever.   HENT:  Positive for congestion, ear pain, postnasal drip, rhinorrhea, sinus pressure and sore throat. Negative for ear discharge.         See HPI   Respiratory:  Negative for cough and shortness of breath.    Cardiovascular:  Negative for chest pain and palpitations.   Gastrointestinal:  Negative for diarrhea, nausea and vomiting.   Neurological:  Negative for  headache.       Past Medical History:   Past Medical History:   Diagnosis Date    Allergic     Motrin    Depression     Exercise-induced asthma     GERD (gastroesophageal reflux disease)     Pharyngitis     RECURRENT       Past Surgical History: History reviewed. No pertinent surgical history.    Family History:   Family History   Problem Relation Age of Onset    Migraines Mother        Social History:   Social History     Socioeconomic History    Marital status: Single   Tobacco Use    Smoking status: Never     Passive exposure: Never    Smokeless tobacco: Never   Vaping Use    Vaping status: Never Used   Substance and Sexual Activity    Alcohol use: Not Currently    Drug use: Never    Sexual activity: Defer       Immunizations:   Immunization History   Administered Date(s) Administered    COVID-19 (MODERNA) 1st,2nd,3rd Dose Monovalent 03/19/2021, 04/20/2021, 11/22/2021, 02/08/2022    DTaP 1995, 1995, 01/05/1998, 06/30/1999, 08/20/2001    Flu Vaccine Split Quad 09/18/2018, 09/22/2019    Flublok 18+yrs 09/22/2019, 10/14/2020, 10/15/2021    Fluzone  >6mos 10/17/2024    Fluzone (or Fluarix & Flulaval for VFC) >6mos 11/13/2022, 11/14/2022    Fluzone Quad >6mos (Multi-dose) 09/22/2019    Hepatitis A 09/18/2018, 03/21/2019    Hepatitis B Adult/Adolescent IM 1995, 1997, 07/30/1998    HiB 1995, 1995, 01/05/1998, 06/30/1998    IPV 1995, 1997, 07/30/1998, 08/10/2001    Influenza, Unspecified 10/15/2020    MMR 06/18/1999, 08/10/2001    Meningococcal, Unspecified 07/06/2012, 09/08/2015    PEDS-Pneumococcal Conjugate (PCV7) 07/16/2000    Pneumococcal Polysaccharide (PPSV23) 1997, 1997, 07/30/1998, 08/10/2001    Tdap 07/28/2008, 09/18/2018    Varicella 06/30/1999, 07/06/2012        Medications:     Current Outpatient Medications:     buPROPion XL (WELLBUTRIN XL) 300 MG 24 hr tablet, Take 1 tablet by mouth Every Morning., Disp: 90 tablet, Rfl: 2    Cetirizine HCl (ZyrTEC  "Allergy) 10 MG capsule, Take 10 mg by mouth Daily., Disp: 90 capsule, Rfl: 2    dicyclomine (BENTYL) 10 MG capsule, Take 1 capsule by mouth 3 (Three) Times a Day As Needed for Abdominal Cramping., Disp: 90 capsule, Rfl: 1    escitalopram (LEXAPRO) 20 MG tablet, Take 1 tablet by mouth Daily., Disp: 90 tablet, Rfl: 2    fluticasone (FLONASE) 50 MCG/ACT nasal spray, Administer 2 sprays into the nostril(s) as directed by provider Daily., Disp: 48 g, Rfl: 2    lansoprazole (PREVACID) 30 MG capsule, Take 1 capsule by mouth Daily., Disp: 90 capsule, Rfl: 2    amoxicillin (AMOXIL) 875 MG tablet, Take 1 tablet by mouth 2 (Two) Times a Day for 10 days., Disp: 20 tablet, Rfl: 0    Allergies:   Allergies   Allergen Reactions    Ibuprofen Rash       Objective     Vital Signs  /96 (BP Location: Right arm, Patient Position: Sitting, Cuff Size: Large Adult)   Pulse 75   Temp 98.4 °F (36.9 °C)   Ht 182.9 cm (72\")   Wt (!) 151 kg (333 lb)   SpO2 96%   BMI 45.16 kg/m²   Estimated body mass index is 45.16 kg/m² as calculated from the following:    Height as of this encounter: 182.9 cm (72\").    Weight as of this encounter: 151 kg (333 lb).            Physical Exam  Vitals and nursing note reviewed.   Constitutional:       General: He is not in acute distress.     Appearance: Normal appearance. He is not ill-appearing.   HENT:      Head: Normocephalic and atraumatic.      Right Ear: Hearing and external ear normal. Tympanic membrane is erythematous.      Left Ear: Hearing, tympanic membrane, ear canal and external ear normal. Tympanic membrane is not erythematous.      Mouth/Throat:      Pharynx: Posterior oropharyngeal erythema present.   Cardiovascular:      Rate and Rhythm: Normal rate and regular rhythm.      Pulses: Normal pulses.      Heart sounds: Normal heart sounds.   Pulmonary:      Effort: Pulmonary effort is normal. No respiratory distress.      Breath sounds: Normal breath sounds.   Musculoskeletal:      Right " lower leg: No edema.      Left lower leg: No edema.   Skin:     General: Skin is warm and dry.   Neurological:      General: No focal deficit present.      Mental Status: He is alert and oriented to person, place, and time.      Motor: No weakness.      Coordination: Coordination normal.   Psychiatric:         Mood and Affect: Mood normal.         Behavior: Behavior normal.           Results:  Recent Results (from the past 24 hours)   Covid-19 + Flu A&B AG, Veritor    Collection Time: 12/10/24  1:30 PM    Specimen: Swab   Result Value Ref Range    SARS Antigen Not Detected Not Detected, Presumptive Negative    Influenza A Antigen SIRI Not Detected Not Detected    Influenza B Antigen SIRI Not Detected Not Detected    Internal Control Passed Passed    Lot Number [id number]     Expiration Date 09/04/2025    POC Rapid Strep A    Collection Time: 12/10/24  1:31 PM    Specimen: Swab   Result Value Ref Range    Rapid Strep A Screen Negative Negative, VALID, INVALID, Not Performed    Internal Control Passed Passed    Lot Number [id number]     Expiration Date 01/03/2027      Assessment and Plan       Diagnoses and all orders for this visit:    1. Acute cough (Primary)  -     POC Rapid Strep A  -     Covid-19 + Flu A&B AG, Veritor    2. Non-recurrent acute serous otitis media of right ear  Assessment & Plan:  Amoxicillin will be prescribed to address the ear infection. He is advised to use Coricidin HBP for decongestion, as it is suitable for patients with high blood pressure.    Orders:  -     amoxicillin (AMOXIL) 875 MG tablet; Take 1 tablet by mouth 2 (Two) Times a Day for 10 days.  Dispense: 20 tablet; Refill: 0    3. Elevated blood pressure reading without diagnosis of hypertension  Assessment & Plan:  He blood pressure was initially high but improved to 144/96 upon rechecking. The elevation may be partially due to his current decongestant medication. He is advised to monitor his blood pressure.          Follow Up  Return if  symptoms worsen or fail to improve.    Sonia White PA-C  [facility]

## 2024-12-10 NOTE — ASSESSMENT & PLAN NOTE
He blood pressure was initially high but improved to 144/96 upon rechecking. The elevation may be partially due to his current decongestant medication. He is advised to monitor his blood pressure.

## 2024-12-13 ENCOUNTER — TELEPHONE (OUTPATIENT)
Dept: FAMILY MEDICINE CLINIC | Facility: CLINIC | Age: 29
End: 2024-12-13
Payer: COMMERCIAL

## 2024-12-13 NOTE — TELEPHONE ENCOUNTER
CALLED PATIENT AND HE STATED HE DIDN'T WANT TO HAVE TO COME IN TO BE SEEN HE WAS JUST ASKING SINCE HE WAS JUST HERE OFFERED SATURDAY CLINIC BUT PATIENT DENIED

## 2025-01-15 ENCOUNTER — TELEMEDICINE (OUTPATIENT)
Dept: FAMILY MEDICINE CLINIC | Facility: CLINIC | Age: 30
End: 2025-01-15
Payer: COMMERCIAL

## 2025-01-15 VITALS — HEIGHT: 72 IN | BODY MASS INDEX: 42.66 KG/M2 | WEIGHT: 315 LBS

## 2025-01-15 DIAGNOSIS — F41.1 GENERALIZED ANXIETY DISORDER WITH PANIC ATTACKS: ICD-10-CM

## 2025-01-15 DIAGNOSIS — E66.813 CLASS 3 SEVERE OBESITY DUE TO EXCESS CALORIES WITHOUT SERIOUS COMORBIDITY WITH BODY MASS INDEX (BMI) OF 45.0 TO 49.9 IN ADULT: ICD-10-CM

## 2025-01-15 DIAGNOSIS — K21.9 GERD WITHOUT ESOPHAGITIS: ICD-10-CM

## 2025-01-15 DIAGNOSIS — F32.9 REACTIVE DEPRESSION: Primary | ICD-10-CM

## 2025-01-15 DIAGNOSIS — E66.01 CLASS 3 SEVERE OBESITY DUE TO EXCESS CALORIES WITHOUT SERIOUS COMORBIDITY WITH BODY MASS INDEX (BMI) OF 45.0 TO 49.9 IN ADULT: ICD-10-CM

## 2025-01-15 DIAGNOSIS — F41.0 GENERALIZED ANXIETY DISORDER WITH PANIC ATTACKS: ICD-10-CM

## 2025-01-15 DIAGNOSIS — K58.0 IRRITABLE BOWEL SYNDROME WITH DIARRHEA: ICD-10-CM

## 2025-01-15 DIAGNOSIS — R73.9 HYPERGLYCEMIA: ICD-10-CM

## 2025-01-15 DIAGNOSIS — J30.1 SEASONAL ALLERGIC RHINITIS DUE TO POLLEN: ICD-10-CM

## 2025-01-15 PROCEDURE — 99214 OFFICE O/P EST MOD 30 MIN: CPT | Performed by: FAMILY MEDICINE

## 2025-01-15 RX ORDER — NALTREXONE HYDROCHLORIDE 50 MG/1
50 TABLET, FILM COATED ORAL
Qty: 30 TABLET | Refills: 3 | Status: SHIPPED | OUTPATIENT
Start: 2025-01-15

## 2025-01-15 NOTE — ASSESSMENT & PLAN NOTE
Patient's (Body mass index is 45.16 kg/m².) indicates that they are obese (BMI >30) with health conditions that include GERD . Weight is worsening. BMI  is above average; BMI management plan is completed. We discussed portion control, increasing exercise, and medication options. .     We will start with addition of naltrexone to his Wellbutrin to try to help more with appetite suppression and he is elayne work on portion control and low sugar and low-carb diet with exercise.    We did discuss side effects    Patient's and if he does not see improvement or has side effects with naltrexone addition we will plan to try for Wegovy or Zepbound given his obesity and obesity related health conditions with his reflux and impaired fasting glucose.    Recheck at follow-up in April or sooner if problems arise

## 2025-01-15 NOTE — PROGRESS NOTES
Patient was seen today through synchronous audio/video technology. Verbal consent was obtained. The patient was located at home. Vitals signs were obtained by patient and recorded.     I was located at our [facility] office for this telehealth visit.    Mode of Visit: Video  Location of patient: -HOME-  Location of provider: +[facility]+  You have chosen to receive care through a telehealth visit.  The patient has signed the video visit consent form.  The visit included audio and video interaction. No technical issues occurred during this visit.    Chief Complaint  Problems with worsening appetite and weight gain.  Would like to discuss weight loss treatment options    History of Present Illness  Armando Mcconnell is a 29 y.o. male presenting via video-conference today for follow-up regarding problems with increased appetite and overeating and weight gain since our last visit.    He has been doing well with his current regimen for anxiety/Depression, GERD, IBS, and allergies    Working full-time at [employer].  Continues with [employer] part-time.  Has noticed more headaches with computer work - plans to get eye checkup done given it has been years since last eye exam.  We did also discuss getting a blood pressure check given his weight gain since our last visit and he will get this checked and message me through his [employer] to let me know how he is doing.    Fasting blood work up-to-date in April 2024 with mild A1c elevation at 5.7.  Encourage diet and exercise efforts given family history of diabetes in both parents.      Anxiety and IBS symptoms remain manageable with Lexapro at 20 mg dosing.  He has been able to effectively get off dicyclomine given improvement in IBS with Lexapro - but does have this to use for flareups if needed.  Anxiety is well controlled.     We did add in wellbutrin given flareups with depression when they lost their family dog last year and this is  "working well with lexapro for flareup of reactive depression    GERD stable with Prevacid.  No dysphagia.    Recurrent problems with seasonal and skin allergy stable with combination of Zyrtec and Flonase.  If he misses a dose of Zyrtec he does tend to have some scalp itching.  Discussed higher dosing of Zyrtec can be helpful for hives he can go up to 20 mg daily if needed.    The following portions of the patient's history were reviewed and updated as appropriate: allergies, current medications, past family history, past medical history, past social history, past surgical history and problem list.    Review of Systems   Constitutional:  Positive for appetite change. Negative for chills, fever and unexpected weight change.   HENT:  Negative for hearing loss.    Eyes:  Negative for visual disturbance.   Respiratory:  Negative for chest tightness, shortness of breath and wheezing.    Cardiovascular:  Negative for chest pain, palpitations and leg swelling.   Gastrointestinal:  Negative for abdominal pain.   Musculoskeletal:  Negative for arthralgias, back pain and gait problem.   Skin:  Negative for rash.   Neurological:  Positive for headaches. Negative for dizziness.   Psychiatric/Behavioral:  Negative for agitation and confusion. The patient is not nervous/anxious.        Objective  Ht 182.9 cm (72\")   Wt (!) 151 kg (333 lb)   BMI 45.16 kg/m²     Physical Exam  Constitutional:       General: He is not in acute distress.     Appearance: He is obese. He is not ill-appearing.   HENT:      Head: Normocephalic and atraumatic.      Right Ear: External ear normal.      Left Ear: External ear normal.      Nose: Nose normal.   Eyes:      Extraocular Movements: Extraocular movements intact.      Conjunctiva/sclera: Conjunctivae normal.      Pupils: Pupils are equal, round, and reactive to light.   Pulmonary:      Effort: Pulmonary effort is normal. No respiratory distress.   Musculoskeletal:         General: Normal range of " motion.      Cervical back: Normal range of motion.   Skin:     Findings: No rash.   Neurological:      General: No focal deficit present.      Mental Status: He is alert and oriented to person, place, and time.   Psychiatric:         Mood and Affect: Mood normal.         Behavior: Behavior normal.         Thought Content: Thought content normal.           Assessment/Plan   Diagnoses and all orders for this visit:    1. Reactive depression (Primary)  Assessment & Plan:  Patient's depression is a recurrent episode that is mild without psychosis. Depression is in full remission and improving with treatment.    Plan:   Continue current medication therapy     Followup at the next regular appointment.       2. Generalized anxiety disorder with panic attacks  Assessment & Plan:  Psychological condition is improving with treatment.  Continue current treatment regimen.  Psychological condition  will be reassessed at the next regular appointment.      3. GERD without esophagitis  Assessment & Plan:  Continue Prevacid      4. Seasonal allergic rhinitis due to pollen  Assessment & Plan:  Continue with Zyrtec and Flonase.  Discussed ability to go up on Zyrtec dose to 20 mg for skin eruption/hives if needed.      5. Irritable bowel syndrome with diarrhea  Assessment & Plan:  Doing well with Lexapro for anxiety control and IBS.  He does not need refills on dicyclomine given he uses this infrequently but will let us know if and when he needs refills      6. Hyperglycemia  Assessment & Plan:  Encouraged diet and exercise efforts.  We did discuss weight gain today and working on portion control and I did recommend weight watchers or Noom as well as continued exercise efforts    He is interested in adding naltrexone to his Wellbutrin to try to help more with appetite suppression and if he has side effects or does not see improvement we can consider Wegovy or Zepbound.    He does have weight related health conditions with reflux and  impaired fasting glucose.      7. Class 3 severe obesity due to excess calories without serious comorbidity with body mass index (BMI) of 45.0 to 49.9 in adult  Assessment & Plan:  Patient's (Body mass index is 45.16 kg/m².) indicates that they are obese (BMI >30) with health conditions that include GERD . Weight is worsening. BMI  is above average; BMI management plan is completed. We discussed portion control, increasing exercise, and medication options. .     We will start with addition of naltrexone to his Wellbutrin to try to help more with appetite suppression and he is elayne work on portion control and low sugar and low-carb diet with exercise.    We did discuss side effects    Patient's and if he does not see improvement or has side effects with naltrexone addition we will plan to try for Wegovy or Zepbound given his obesity and obesity related health conditions with his reflux and impaired fasting glucose.    Recheck at follow-up in April or sooner if problems arise    Orders:  -     naltrexone (DEPADE) 50 MG tablet; Take 1 tablet by mouth every night at bedtime.  Dispense: 30 tablet; Refill: 3                Return in about 3 months (around 4/14/2025) for Med recheck.    Clay Kwon MD

## 2025-01-15 NOTE — ASSESSMENT & PLAN NOTE
Encouraged diet and exercise efforts.  We did discuss weight gain today and working on portion control and I did recommend weight watchers or Noom as well as continued exercise efforts    He is interested in adding naltrexone to his Wellbutrin to try to help more with appetite suppression and if he has side effects or does not see improvement we can consider Wegovy or Zepbound.    He does have weight related health conditions with reflux and impaired fasting glucose.

## 2025-01-24 ENCOUNTER — PATIENT MESSAGE (OUTPATIENT)
Dept: FAMILY MEDICINE CLINIC | Facility: CLINIC | Age: 30
End: 2025-01-24
Payer: COMMERCIAL

## 2025-01-30 ENCOUNTER — TELEPHONE (OUTPATIENT)
Dept: FAMILY MEDICINE CLINIC | Facility: CLINIC | Age: 30
End: 2025-01-30
Payer: COMMERCIAL

## 2025-02-12 ENCOUNTER — PATIENT MESSAGE (OUTPATIENT)
Dept: FAMILY MEDICINE CLINIC | Facility: CLINIC | Age: 30
End: 2025-02-12
Payer: COMMERCIAL

## 2025-04-07 ENCOUNTER — OFFICE VISIT (OUTPATIENT)
Age: 30
End: 2025-04-07
Payer: COMMERCIAL

## 2025-04-07 VITALS
DIASTOLIC BLOOD PRESSURE: 90 MMHG | BODY MASS INDEX: 44.1 KG/M2 | HEIGHT: 71 IN | SYSTOLIC BLOOD PRESSURE: 144 MMHG | HEART RATE: 101 BPM | WEIGHT: 315 LBS

## 2025-04-07 DIAGNOSIS — E66.813 CLASS 3 SEVERE OBESITY DUE TO EXCESS CALORIES WITHOUT SERIOUS COMORBIDITY WITH BODY MASS INDEX (BMI) OF 45.0 TO 49.9 IN ADULT: Primary | ICD-10-CM

## 2025-04-07 DIAGNOSIS — E66.01 CLASS 3 SEVERE OBESITY DUE TO EXCESS CALORIES WITHOUT SERIOUS COMORBIDITY WITH BODY MASS INDEX (BMI) OF 45.0 TO 49.9 IN ADULT: Primary | ICD-10-CM

## 2025-04-07 DIAGNOSIS — Z79.899 MEDICATION MANAGEMENT: ICD-10-CM

## 2025-04-07 LAB
BUPRENORPHINE SERPL-MCNC: NEGATIVE NG/ML
MDMA UR QL SCN: NEGATIVE
POC AMPHETAMINES: NEGATIVE
POC BARBITURATES: NEGATIVE
POC BENZODIAZEPHINES: NEGATIVE
POC COCAINE: NEGATIVE
POC METHADONE: NEGATIVE
POC METHAMPHETAMINE SCREEN URINE: NEGATIVE
POC OPIATES: NEGATIVE
POC OXYCODONE: NEGATIVE
POC PHENCYCLIDINE: NEGATIVE
POC THC: NEGATIVE

## 2025-04-07 PROCEDURE — 99205 OFFICE O/P NEW HI 60 MIN: CPT | Performed by: NURSE PRACTITIONER

## 2025-04-07 RX ORDER — PHENTERMINE HYDROCHLORIDE 37.5 MG/1
37.5 TABLET ORAL
Qty: 30 TABLET | Refills: 0 | Status: SHIPPED | OUTPATIENT
Start: 2025-04-07

## 2025-04-07 NOTE — PROGRESS NOTES
"femi  [facility]  [address] [place] [address]      Date: 2025  Patient Name: Armando Mcconnell  MRN: [id number]  : 1995    Subjective     Chief Complaint  Obesity Management consult, nutrition counseling       History of Present Illness:  Armando Mcconnell presents to [facility] for obesity management. Personal goal is to lose 100 pounds to feel confident about his body, to feel and look better.    Weight history:  Highest lifetime weight: 340 pounds. Today's weight is (!) 156 kg (344 lb 12.8 oz) pounds.   Weight 5 years ago: ?    Current lifestyle:   The following seem to sabotage weight loss efforts:Lack of time for planning & self, comfort/stress eating, enjoyment of food, eating late, specific cravings like carbohydrates, mindless eating (snacking while working or watching TV), always hungry, boredom eating, and portion sizes    Past diets: counting calories  Past weight loss medications: Contrave (took for one week, side effect; dry mouth and sleepiness)    Typical Diet:  Breakfast: protein bar  Lunch: sandwich, chips, cookie  Dinner: \"big\" home cooked meal, dessert  Snacks: chips, candy  Beverages: water, soda, sports drinks, coffee    Pertinent medical history:  Pancreatitis: no  Seizures: no  Glaucoma: no  Headaches: no  HTN: yes, 144/90 today and he thinks this has become usual for him since gaining weight  Heart palpitations: no  Thyroid C cell cancer personal or family hx: no  MEN syndrome personal or family hx: no  Nephrolithiasis: no    PHQ-9 Total Score:   Little interest or pleasure in doing things? Several days   Feeling down, depressed, or hopeless? Not at all   PHQ-2 Total Score 1            Review of Systems   Constitutional:  Positive for unexpected weight gain. Negative for appetite change.        Positive for weight gain   HENT:  Negative for trouble swallowing.         Negative for throat " "swelling   Eyes:  Negative for visual disturbance.   Respiratory:  Positive for apnea (Snoring). Negative for shortness of breath and wheezing.         Negative for snoring   Cardiovascular:  Negative for chest pain, palpitations and leg swelling.   Gastrointestinal:  Negative for abdominal pain, constipation, diarrhea, GERD and indigestion.   Endocrine: Negative for cold intolerance, heat intolerance, polydipsia, polyphagia and polyuria.        Negative for loss of hair  Negative for hirsutism     Genitourinary:         Denies menstrual irregularities   Musculoskeletal:  Negative for arthralgias.        Denies exercise limitations  Denies chronic pain   Skin:  Negative for dry skin.        Negative for acne   Neurological:  Negative for light-headedness, headache and memory problem.        Negative for paresthesias   Psychiatric/Behavioral:  Negative for self-injury, sleep disturbance, suicidal ideas and depressed mood. The patient is not nervous/anxious.    All other systems reviewed and are negative.        Objective     Body mass index is 48.09 kg/m².   Body composition analysis completed and showed:   Body Fat %: 60.3     Measurements (in inches)  Measurements (in inches) Waist Circumference: 53   Neck: 18.5  Chest: 51.5  Hips: 57.5  Thighs: 40.5    Vital Signs:   /90   Pulse 101   Ht 180.3 cm (71\")   Wt (!) 156 kg (344 lb 12.8 oz)   BMI 48.09 kg/m²     Physical Exam  Constitutional:       Appearance: Normal appearance.   HENT:      Head: Normocephalic and atraumatic.   Pulmonary:      Effort: Pulmonary effort is normal.   Neurological:      Mental Status: He is alert and oriented to person, place, and time.   Psychiatric:         Mood and Affect: Mood normal.         Thought Content: Thought content normal.           Results Reviewed:   Component  Ref Range & Units (hover) 16:03   Methamphetaine Screen, Urine Negative   POC Amphetamines Negative   Barbiturates Screen Negative   Benzodiazepine Screen " Negative   Cocaine Screen Negative   Methadone Screen Negative   Opiate Screen Negative   Oxycodone, Screen Negative   Phencyclidine (PCP) Screen Negative   THC, Screen Negative   Buprenorphine, Screen, Urine Negative   MDMA (ECSTASY) Negative      Latest Reference Range & Units 04/17/24 11:48   Sodium 134 - 144 mmol/L 140   Potassium 3.5 - 5.2 mmol/L 5.1   Chloride 96 - 106 mmol/L 101   CO2 20 - 29 mmol/L 25   BUN 6 - 20 mg/dL 15   Creatinine 0.76 - 1.27 mg/dL 0.95   BUN/Creatinine Ratio 9 - 20  16   EGFR Result >59 mL/min/1.73 113   Glucose 70 - 99 mg/dL 81   Calcium 8.7 - 10.2 mg/dL 9.8   Alkaline Phosphatase 44 - 121 IU/L 56   Total Protein 6.0 - 8.5 g/dL 6.9   Albumin 4.3 - 5.2 g/dL 4.8   A/G Ratio 1.2 - 2.2  2.3 (H)   AST (SGOT) 0 - 40 IU/L 22   ALT (SGPT) 0 - 44 IU/L 25   Total Bilirubin 0.0 - 1.2 mg/dL <0.2   Hemoglobin A1C 4.8 - 5.6 % 5.7 (H)   TSH Baseline 0.450 - 4.500 uIU/mL 2.500   Free T4 0.82 - 1.77 ng/dL 1.25   Total Cholesterol 100 - 199 mg/dL 202 (H)   HDL Cholesterol >39 mg/dL 56   LDL Cholesterol  0 - 99 mg/dL 132 (H)   Triglycerides 0 - 149 mg/dL 75   VLDL Cholesterol Sourav 5 - 40 mg/dL 14   (H): Data is abnormally high    Assessment / Plan       Diagnoses and all orders for this visit:    1. Class 3 severe obesity due to excess calories without serious comorbidity with body mass index (BMI) of 45.0 to 49.9 in adult (Primary)  Assessment & Plan:  Patient's (Body mass index is 48.09 kg/m².) indicates that they are morbidly/severely obese (BMI > 40 or > 35 with obesity - related health condition) with health conditions that include hypertension and GERD . Weight is newly identified. BMI  is above average; BMI management plan is completed. We discussed low calorie, low carb based diet program, portion control, increasing exercise, pharmacologic options including phentermine, and an alba-based approach such as C2cube Pal or Lose It.       -- This is an initial visit. Topics of discussion included  obesity as a disease, nutritional education on food groups, exercise, and medications.Patient's past medical history was reviewed in detail and barriers to weight loss were identified and discussed. Past efforts at weight reduction on their own as well as under physician supervision were documented and discussed.  I advised patient to continue routine care with their Primary Care Provider.  --Body composition analysis was reviewed. Short and long-term weight loss goals set up based on this information.  --Patient was instructed on adequate protein, controlled carb and controlled fat intake. Baritastic food journal set up with calorie and macro goals set.  Patient encouraged to start journaling daily.  Encouraged that we are not necessarily looking for perfection but starting to learn where calories and macros are staying.  Patient advised that were looking to stay at or below calorie and carbohydrate levels and at or above protein and fiber levels. Asked patient to bring in food journal to next office visit for brief review  --Patient is to try nutritonal/behavioral changes.  --Start phentermine. Dosing instructions, adverse effects and side effects discussed with patient  BP is high today and he thinks it's been running in the 140 systolic range lately. We will keep an eye on this while taking phentermine. He has an appointment with his family doctor in 2 weeks and he was encouraged to discuss blood pressure at that time.        Orders:  -     phentermine (ADIPEX-P) 37.5 MG tablet; Take 1 tablet by mouth Daily Before Lunch. Decongestants should be avoided while taking this medication.  Dispense: 30 tablet; Refill: 0    2. Medication management  -     POC Urine Drug Screen, Triage        I spent 60 minutes caring for Armando on this date of service. This time includes time spent by me in the following activities:preparing for the visit, reviewing tests, counseling and educating the patient/family/caregiver,  ordering medications, tests, or procedures, and documenting information in the medical record  Follow Up   Return in about 4 weeks (around 5/5/2025).  Patient was given instructions and counseling regarding his condition or for health maintenance advice. Please see specific information pulled into the AVS if appropriate.     Anthony Aldridge, JEWEL  04/07/2025

## 2025-04-07 NOTE — ASSESSMENT & PLAN NOTE
Patient's (Body mass index is 48.09 kg/m².) indicates that they are morbidly/severely obese (BMI > 40 or > 35 with obesity - related health condition) with health conditions that include hypertension and GERD . Weight is newly identified. BMI  is above average; BMI management plan is completed. We discussed low calorie, low carb based diet program, portion control, increasing exercise, pharmacologic options including phentermine, and an alba-based approach such as [employer] Pal or Lose It.       -- This is an initial visit. Topics of discussion included obesity as a disease, nutritional education on food groups, exercise, and medications.Patient's past medical history was reviewed in detail and barriers to weight loss were identified and discussed. Past efforts at weight reduction on their own as well as under physician supervision were documented and discussed.  I advised patient to continue routine care with their Primary Care Provider.  --Body composition analysis was reviewed. Short and long-term weight loss goals set up based on this information.  --Patient was instructed on adequate protein, controlled carb and controlled fat intake. Baritastic food journal set up with calorie and macro goals set.  Patient encouraged to start journaling daily.  Encouraged that we are not necessarily looking for perfection but starting to learn where calories and macros are staying.  Patient advised that were looking to stay at or below calorie and carbohydrate levels and at or above protein and fiber levels. Asked patient to bring in food journal to next office visit for brief review  --Patient is to try nutritonal/behavioral changes.  --Start phentermine. Dosing instructions, adverse effects and side effects discussed with patient  BP is high today and he thinks it's been running in the 140 systolic range lately. We will keep an eye on this while taking phentermine. He has an appointment with his family doctor in 2 weeks and  he was encouraged to discuss blood pressure at that time.

## 2025-04-14 ENCOUNTER — OFFICE VISIT (OUTPATIENT)
Dept: FAMILY MEDICINE CLINIC | Facility: CLINIC | Age: 30
End: 2025-04-14
Payer: COMMERCIAL

## 2025-04-14 VITALS
HEIGHT: 71 IN | DIASTOLIC BLOOD PRESSURE: 96 MMHG | SYSTOLIC BLOOD PRESSURE: 142 MMHG | WEIGHT: 315 LBS | OXYGEN SATURATION: 99 % | BODY MASS INDEX: 44.1 KG/M2 | HEART RATE: 114 BPM

## 2025-04-14 DIAGNOSIS — F32.9 REACTIVE DEPRESSION: ICD-10-CM

## 2025-04-14 DIAGNOSIS — K21.9 GERD WITHOUT ESOPHAGITIS: Chronic | ICD-10-CM

## 2025-04-14 DIAGNOSIS — E66.01 CLASS 3 SEVERE OBESITY DUE TO EXCESS CALORIES WITHOUT SERIOUS COMORBIDITY WITH BODY MASS INDEX (BMI) OF 45.0 TO 49.9 IN ADULT: ICD-10-CM

## 2025-04-14 DIAGNOSIS — J30.1 SEASONAL ALLERGIC RHINITIS DUE TO POLLEN: Chronic | ICD-10-CM

## 2025-04-14 DIAGNOSIS — F41.1 GENERALIZED ANXIETY DISORDER WITH PANIC ATTACKS: Chronic | ICD-10-CM

## 2025-04-14 DIAGNOSIS — Z13.1 DIABETES MELLITUS SCREENING: ICD-10-CM

## 2025-04-14 DIAGNOSIS — R03.0 ELEVATED BLOOD PRESSURE READING WITHOUT DIAGNOSIS OF HYPERTENSION: ICD-10-CM

## 2025-04-14 DIAGNOSIS — Z00.00 GENERAL MEDICAL EXAM: Primary | ICD-10-CM

## 2025-04-14 DIAGNOSIS — K58.0 IRRITABLE BOWEL SYNDROME WITH DIARRHEA: ICD-10-CM

## 2025-04-14 DIAGNOSIS — F41.0 GENERALIZED ANXIETY DISORDER WITH PANIC ATTACKS: Chronic | ICD-10-CM

## 2025-04-14 DIAGNOSIS — E66.813 CLASS 3 SEVERE OBESITY DUE TO EXCESS CALORIES WITHOUT SERIOUS COMORBIDITY WITH BODY MASS INDEX (BMI) OF 45.0 TO 49.9 IN ADULT: ICD-10-CM

## 2025-04-14 PROCEDURE — 99395 PREV VISIT EST AGE 18-39: CPT | Performed by: FAMILY MEDICINE

## 2025-04-14 RX ORDER — BUPROPION HYDROCHLORIDE 150 MG/1
150 TABLET ORAL EVERY MORNING
Qty: 90 TABLET | Refills: 2 | Status: SHIPPED | OUTPATIENT
Start: 2025-04-14

## 2025-04-14 RX ORDER — DICYCLOMINE HYDROCHLORIDE 10 MG/1
10 CAPSULE ORAL 3 TIMES DAILY PRN
Qty: 90 CAPSULE | Refills: 1 | Status: SHIPPED | OUTPATIENT
Start: 2025-04-14

## 2025-04-14 RX ORDER — CETIRIZINE HYDROCHLORIDE 10 MG/1
10 CAPSULE, LIQUID FILLED ORAL EVERY 24 HOURS
Qty: 90 CAPSULE | Refills: 2 | Status: SHIPPED | OUTPATIENT
Start: 2025-04-14

## 2025-04-14 RX ORDER — LANSOPRAZOLE 30 MG/1
30 CAPSULE, DELAYED RELEASE ORAL DAILY
Qty: 90 CAPSULE | Refills: 2 | Status: SHIPPED | OUTPATIENT
Start: 2025-04-14

## 2025-04-14 RX ORDER — FLUTICASONE PROPIONATE 50 MCG
2 SPRAY, SUSPENSION (ML) NASAL DAILY
Qty: 48 G | Refills: 2 | Status: SHIPPED | OUTPATIENT
Start: 2025-04-14

## 2025-04-14 RX ORDER — ESCITALOPRAM OXALATE 20 MG/1
20 TABLET ORAL DAILY
Qty: 90 TABLET | Refills: 2 | Status: SHIPPED | OUTPATIENT
Start: 2025-04-14

## 2025-04-14 NOTE — ASSESSMENT & PLAN NOTE
Discussed together health maintenance and screening along with vaccination options and healthy diet and exercise habits as part of the preventative counseling at their physical exam today.     Discussed tachycardia and he had coffee with his phentermine and we discussed cutting back on caffeine while on phentermine from wt management and monitoring his heart-rate and bp

## 2025-04-14 NOTE — ASSESSMENT & PLAN NOTE
Continue with Zyrtec and Flonase.      Understands ability to go up on Zyrtec dose to 20 mg for skin eruption/hives if needed.

## 2025-04-14 NOTE — ASSESSMENT & PLAN NOTE
Discussed BP elevation and phentermine    He is going to monitor this at home and with wt management - hopefully it will improve with wt loss but if ongoing elevation will need to stop phentermine.    Recheck at followup or sooner if problems arise

## 2025-04-14 NOTE — ASSESSMENT & PLAN NOTE
Patient's (Body mass index is 47.52 kg/m².) indicates that they are morbidly/severely obese (BMI > 40 or > 35 with obesity - related health condition) with health conditions that include impaired fasting glucose and GERD . Weight is improving with lifestyle modifications. BMI  is above average; BMI management plan is completed. We discussed portion control and increasing exercise.

## 2025-04-14 NOTE — ASSESSMENT & PLAN NOTE
Doing well with Lexapro for anxiety control and IBS.      He does need refills on dicyclomine but is able to use this infrequently

## 2025-04-14 NOTE — PROGRESS NOTES
"Chief Complaint  Physical     Subjective    History of Present Illness:  Armando Mcconnell is a 29 y.o. male who presents today for physical exam.    Did have a protein shake this morning.      He has been doing well with his current regimen for anxiety/Depression, GERD, IBS, and allergies    Working full-time at [employer].  Continues with [employer] part-time.      We did try Wellbutrin with naltrexone to help with appetite/wt loss and he had too many side-effects (sedation with naltrexone).  He was referred to wt management and is now on 150mg dose of WellbutrinXL and they have added phentermine.    Fasting blood work done at his last physical in April 2024 with mild A1c elevation at 5.7.  Encourage diet and exercise efforts given family history of diabetes in both parents.      Anxiety and IBS symptoms remain manageable with Lexapro at 20 mg dosing.  He has been using dicyclomine rarely given improvement in IBS with Lexapro - but does have this to use for flareups if needed.  Anxiety is well controlled.     We did add in wellbutrin given flareups with depression when they lost their family dog and this is working well with lexapro for flareup of reactive depression    GERD stable with Prevacid.  No dysphagia.    Recurrent problems with seasonal and skin allergy stable with combination of Zyrtec and Flonase.        Objective   Vital Signs:   /96 (BP Location: Left arm, Patient Position: Sitting, Cuff Size: Large Adult)   Pulse 114   Ht 180.3 cm (71\")   Wt (!) 155 kg (340 lb 11.2 oz)   SpO2 99%   BMI 47.52 kg/m²     Review of Systems   Constitutional:  Negative for appetite change, chills and fever.   HENT:  Negative for hearing loss.    Eyes:  Negative for blurred vision.   Respiratory:  Negative for chest tightness.    Cardiovascular:  Negative for chest pain.   Gastrointestinal:  Negative for abdominal pain.   Musculoskeletal:  Negative for gait problem.   Skin:  Negative for rash. "   Psychiatric/Behavioral:  Negative for depressed mood.        Past History:  Medical History: has a past medical history of Allergic, Depression, Exercise-induced asthma, GERD (gastroesophageal reflux disease), and Pharyngitis.   Surgical History: has a past surgical history that includes Tonsillectomy and Wolverine tooth extraction.   Family History: family history includes Diabetes in his father and mother; Migraines in his mother; Obesity in his father and mother.   Social History: reports that he has never smoked. He has never been exposed to tobacco smoke. He has never used smokeless tobacco. He reports that he does not currently use alcohol. He reports that he does not use drugs.      Current Outpatient Medications:     buPROPion XL (WELLBUTRIN XL) 150 MG 24 hr tablet, Take 1 tablet by mouth Every Morning., Disp: 90 tablet, Rfl: 2    Cetirizine HCl (ZyrTEC Allergy) 10 MG capsule, Take 10 mg by mouth Daily., Disp: 90 capsule, Rfl: 2    dicyclomine (BENTYL) 10 MG capsule, Take 1 capsule by mouth 3 (Three) Times a Day As Needed for Abdominal Cramping., Disp: 90 capsule, Rfl: 1    escitalopram (LEXAPRO) 20 MG tablet, Take 1 tablet by mouth Daily., Disp: 90 tablet, Rfl: 2    fluticasone (FLONASE) 50 MCG/ACT nasal spray, Administer 2 sprays into the nostril(s) as directed by provider Daily., Disp: 48 g, Rfl: 2    lansoprazole (PREVACID) 30 MG capsule, Take 1 capsule by mouth Daily., Disp: 90 capsule, Rfl: 2    phentermine (ADIPEX-P) 37.5 MG tablet, Take 1 tablet by mouth Daily Before Lunch. Decongestants should be avoided while taking this medication., Disp: 30 tablet, Rfl: 0    Allergies: Ibuprofen    Physical Exam  Constitutional:       Appearance: He is obese.   HENT:      Head: Normocephalic.      Right Ear: External ear normal.      Left Ear: External ear normal.      Nose: Nose normal.      Mouth/Throat:      Mouth: Mucous membranes are moist.      Pharynx: Oropharynx is clear.   Eyes:      Pupils: Pupils are  equal, round, and reactive to light.   Cardiovascular:      Rate and Rhythm: Normal rate and regular rhythm. Tachycardia present.      Heart sounds: Normal heart sounds. No murmur heard.     No friction rub. No gallop.   Pulmonary:      Effort: Pulmonary effort is normal.      Breath sounds: Normal breath sounds.   Musculoskeletal:         General: Normal range of motion.      Cervical back: Normal range of motion.   Skin:     General: Skin is warm and dry.   Neurological:      General: No focal deficit present.      Mental Status: He is alert and oriented to person, place, and time.   Psychiatric:         Mood and Affect: Mood normal.         Behavior: Behavior normal.         Thought Content: Thought content normal.          Result Review                   Assessment and Plan  Diagnoses and all orders for this visit:    1. General medical exam (Primary)  Assessment & Plan:  Discussed together health maintenance and screening along with vaccination options and healthy diet and exercise habits as part of the preventative counseling at their physical exam today.     Discussed tachycardia and he had coffee with his phentermine and we discussed cutting back on caffeine while on phentermine from wt management and monitoring his heart-rate and bp    Orders:  -     CBC & Differential; Future  -     Comprehensive Metabolic Panel; Future  -     Lipid Panel; Future  -     TSH; Future  -     T4, Free; Future    2. Diabetes mellitus screening  -     Hemoglobin A1c; Future    3. Elevated blood pressure reading without diagnosis of hypertension  Assessment & Plan:  Discussed BP elevation and phentermine    He is going to monitor this at home and with wt management - hopefully it will improve with wt loss but if ongoing elevation will need to stop phentermine.    Recheck at followup or sooner if problems arise       4. Generalized anxiety disorder with panic attacks  Assessment & Plan:  Psychological condition is improving with  treatment.  Continue current treatment regimen.  Psychological condition  will be reassessed at the next regular appointment.    Orders:  -     escitalopram (LEXAPRO) 20 MG tablet; Take 1 tablet by mouth Daily.  Dispense: 90 tablet; Refill: 2    5. Reactive depression  Assessment & Plan:  Patient's depression is a recurrent episode that is mild without psychosis. Depression is in full remission and improving with treatment.    Plan:   Continue current medication therapy     Followup at the next regular appointment.     Orders:  -     buPROPion XL (WELLBUTRIN XL) 150 MG 24 hr tablet; Take 1 tablet by mouth Every Morning.  Dispense: 90 tablet; Refill: 2    6. GERD without esophagitis  Assessment & Plan:  Continue Prevacid    Orders:  -     lansoprazole (PREVACID) 30 MG capsule; Take 1 capsule by mouth Daily.  Dispense: 90 capsule; Refill: 2    7. Seasonal allergic rhinitis due to pollen  Assessment & Plan:  Continue with Zyrtec and Flonase.      Understands ability to go up on Zyrtec dose to 20 mg for skin eruption/hives if needed.    Orders:  -     Cetirizine HCl (ZyrTEC Allergy) 10 MG capsule; Take 10 mg by mouth Daily.  Dispense: 90 capsule; Refill: 2  -     fluticasone (FLONASE) 50 MCG/ACT nasal spray; Administer 2 sprays into the nostril(s) as directed by provider Daily.  Dispense: 48 g; Refill: 2    8. Irritable bowel syndrome with diarrhea  Assessment & Plan:  Doing well with Lexapro for anxiety control and IBS.      He does need refills on dicyclomine but is able to use this infrequently       Orders:  -     dicyclomine (BENTYL) 10 MG capsule; Take 1 capsule by mouth 3 (Three) Times a Day As Needed for Abdominal Cramping.  Dispense: 90 capsule; Refill: 1    9. Class 3 severe obesity due to excess calories without serious comorbidity with body mass index (BMI) of 45.0 to 49.9 in adult  Assessment & Plan:  Patient's (Body mass index is 47.52 kg/m².) indicates that they are morbidly/severely obese (BMI > 40 or >  35 with obesity - related health condition) with health conditions that include impaired fasting glucose and GERD . Weight is improving with lifestyle modifications. BMI  is above average; BMI management plan is completed. We discussed portion control and increasing exercise.                      Follow Up  Return in about 6 months (around 10/14/2025) for Med recheck.    Clay Kwon MD

## 2025-04-15 LAB
ALBUMIN SERPL-MCNC: 4.9 G/DL (ref 4.3–5.2)
ALP SERPL-CCNC: 70 IU/L (ref 44–121)
ALT SERPL-CCNC: 32 IU/L (ref 0–44)
AST SERPL-CCNC: 27 IU/L (ref 0–40)
BASOPHILS # BLD AUTO: 0.1 X10E3/UL (ref 0–0.2)
BASOPHILS NFR BLD AUTO: 1 %
BILIRUB SERPL-MCNC: 0.4 MG/DL (ref 0–1.2)
BUN SERPL-MCNC: 15 MG/DL (ref 6–20)
BUN/CREAT SERPL: 14 (ref 9–20)
CALCIUM SERPL-MCNC: 9.7 MG/DL (ref 8.7–10.2)
CHLORIDE SERPL-SCNC: 102 MMOL/L (ref 96–106)
CHOLEST SERPL-MCNC: 225 MG/DL (ref 100–199)
CO2 SERPL-SCNC: 21 MMOL/L (ref 20–29)
CREAT SERPL-MCNC: 1.05 MG/DL (ref 0.76–1.27)
EGFRCR SERPLBLD CKD-EPI 2021: 100 ML/MIN/1.73
EOSINOPHIL # BLD AUTO: 0.1 X10E3/UL (ref 0–0.4)
EOSINOPHIL NFR BLD AUTO: 2 %
ERYTHROCYTE [DISTWIDTH] IN BLOOD BY AUTOMATED COUNT: 12.8 % (ref 11.6–15.4)
GLOBULIN SER CALC-MCNC: 2.4 G/DL (ref 1.5–4.5)
GLUCOSE SERPL-MCNC: 98 MG/DL (ref 70–99)
HBA1C MFR BLD: 5.8 % (ref 4.8–5.6)
HCT VFR BLD AUTO: 44.8 % (ref 37.5–51)
HDLC SERPL-MCNC: 36 MG/DL
HGB BLD-MCNC: 15.1 G/DL (ref 13–17.7)
IMM GRANULOCYTES # BLD AUTO: 0 X10E3/UL (ref 0–0.1)
IMM GRANULOCYTES NFR BLD AUTO: 0 %
LDLC SERPL CALC-MCNC: 160 MG/DL (ref 0–99)
LYMPHOCYTES # BLD AUTO: 1.9 X10E3/UL (ref 0.7–3.1)
LYMPHOCYTES NFR BLD AUTO: 34 %
MCH RBC QN AUTO: 31.1 PG (ref 26.6–33)
MCHC RBC AUTO-ENTMCNC: 33.7 G/DL (ref 31.5–35.7)
MCV RBC AUTO: 92 FL (ref 79–97)
MONOCYTES # BLD AUTO: 0.5 X10E3/UL (ref 0.1–0.9)
MONOCYTES NFR BLD AUTO: 10 %
NEUTROPHILS # BLD AUTO: 3 X10E3/UL (ref 1.4–7)
NEUTROPHILS NFR BLD AUTO: 53 %
PLATELET # BLD AUTO: 325 X10E3/UL (ref 150–450)
POTASSIUM SERPL-SCNC: 4.3 MMOL/L (ref 3.5–5.2)
PROT SERPL-MCNC: 7.3 G/DL (ref 6–8.5)
RBC # BLD AUTO: 4.86 X10E6/UL (ref 4.14–5.8)
SODIUM SERPL-SCNC: 138 MMOL/L (ref 134–144)
T4 FREE SERPL-MCNC: 1.28 NG/DL (ref 0.82–1.77)
TRIGL SERPL-MCNC: 160 MG/DL (ref 0–149)
TSH SERPL DL<=0.005 MIU/L-ACNC: 1.5 UIU/ML (ref 0.45–4.5)
VLDLC SERPL CALC-MCNC: 29 MG/DL (ref 5–40)
WBC # BLD AUTO: 5.7 X10E3/UL (ref 3.4–10.8)

## 2025-04-18 ENCOUNTER — PATIENT ROUNDING (BHMG ONLY) (OUTPATIENT)
Dept: BARIATRICS/WEIGHT MGMT | Facility: CLINIC | Age: 30
End: 2025-04-18
Payer: COMMERCIAL

## 2025-05-05 DIAGNOSIS — E66.813 CLASS 3 SEVERE OBESITY DUE TO EXCESS CALORIES WITHOUT SERIOUS COMORBIDITY WITH BODY MASS INDEX (BMI) OF 45.0 TO 49.9 IN ADULT: ICD-10-CM

## 2025-05-05 DIAGNOSIS — E66.01 CLASS 3 SEVERE OBESITY DUE TO EXCESS CALORIES WITHOUT SERIOUS COMORBIDITY WITH BODY MASS INDEX (BMI) OF 45.0 TO 49.9 IN ADULT: ICD-10-CM

## 2025-05-06 RX ORDER — PHENTERMINE HYDROCHLORIDE 37.5 MG/1
TABLET ORAL
Qty: 15 TABLET | Refills: 0 | Status: SHIPPED | OUTPATIENT
Start: 2025-05-06

## 2025-05-15 ENCOUNTER — OFFICE VISIT (OUTPATIENT)
Age: 30
End: 2025-05-15
Payer: COMMERCIAL

## 2025-05-15 VITALS
DIASTOLIC BLOOD PRESSURE: 90 MMHG | WEIGHT: 315 LBS | HEIGHT: 70 IN | BODY MASS INDEX: 45.1 KG/M2 | HEART RATE: 92 BPM | SYSTOLIC BLOOD PRESSURE: 142 MMHG

## 2025-05-15 DIAGNOSIS — E66.01 CLASS 3 SEVERE OBESITY DUE TO EXCESS CALORIES WITHOUT SERIOUS COMORBIDITY WITH BODY MASS INDEX (BMI) OF 45.0 TO 49.9 IN ADULT: Primary | ICD-10-CM

## 2025-05-15 DIAGNOSIS — E66.813 CLASS 3 SEVERE OBESITY DUE TO EXCESS CALORIES WITHOUT SERIOUS COMORBIDITY WITH BODY MASS INDEX (BMI) OF 45.0 TO 49.9 IN ADULT: Primary | ICD-10-CM

## 2025-05-15 PROCEDURE — 99214 OFFICE O/P EST MOD 30 MIN: CPT | Performed by: NURSE PRACTITIONER

## 2025-05-15 RX ORDER — PHENTERMINE HYDROCHLORIDE 37.5 MG/1
18.75 TABLET ORAL DAILY
Qty: 15 TABLET | Refills: 0 | Status: SHIPPED | OUTPATIENT
Start: 2025-05-15

## 2025-05-15 NOTE — PROGRESS NOTES
[facility]  [address] [place] [address]  TR[place] [id number]    Office Note Follow Up      Date: 05/15/2025  Patient Name: Armando Mcconnell  MRN: [id number]  : 1995    Subjective     Chief Complaint  Obesity Management follow-up    Armando Mcconnell presents to [facility] for obesity management.     Patient is satisfied with weight loss progress. Appetite is well controlled. Reports no side effects of prescribed medications, phentermine. He has taken phentermine and drank an energy drink and had heart palpitations but has stopped this practice and has had no further palpitations. On occasion he feels a little shaky after taking phentermine in the mornings but eating resolves this. He has not been monitoring blood pressure at home.Blood pressure is high today but at baseline, same as prior to starting phentermine. BP at PCP office two weeks after starting phentermine also at baseline. The patient is taking multivitamin and is not taking fish oil.  The patient is using a food journal.    Average daily calories: 1500  Average daily protein: 100  Average daily net carbs: 93   The patient is exercising with a FITT score of:    Frequency Intensity Time Strength Training   [x]   0, none [x]   0 [x]   0 [x]   0   []   1 (1-2x/week) []   1 (light) []   1 (<10 min) []   1 (1x/week)   []   2 (3-5x/week) []   2 (moderate) []   2 (10-20 min) []   2 (2x/week)   []   3 (daily) []   3 (moderately hard)  []   4 (very hard) []   3 (20-30 min)  []   4 (>30 min) []   3 (3-4x/week)     Review of Systems   Constitutional:  Negative for appetite change and fatigue.   Eyes:  Negative for visual disturbance.   Cardiovascular:  Negative for chest pain and palpitations.   Gastrointestinal:  Negative for constipation and indigestion.   Neurological:  Negative for light-headedness.   All other systems reviewed and are negative.      Objective   Start weight: 344.8 pounds.   "  Total Loss lb/%Loss of beginning body weight (BBW): -18.4 lb/-5.34 %  Change in weight since last visit: -18.4    Recent Weight History:   Wt Readings from Last 6 Encounters:   05/15/25 (!) 148 kg (326 lb 6.4 oz)   04/14/25 (!) 155 kg (340 lb 11.2 oz)   04/07/25 (!) 156 kg (344 lb 12.8 oz)   01/15/25 (!) 151 kg (333 lb)   12/10/24 (!) 151 kg (333 lb)   10/17/24 (!) 142 kg (314 lb)         Body mass index is 46.83 kg/m².   Body composition analysis completed and showed:   Body Fat %: 50.3    Measurements (in inches)  Waist Circumference: 53    Vital Signs:   /90 (BP Location: Left arm, Patient Position: Sitting)   Pulse 92   Ht 177.8 cm (70\")   Wt (!) 148 kg (326 lb 6.4 oz)   BMI 46.83 kg/m²       Physical Exam  Constitutional:       Appearance: Normal appearance.   HENT:      Head: Normocephalic and atraumatic.   Pulmonary:      Effort: Pulmonary effort is normal.   Neurological:      Mental Status: He is alert and oriented to person, place, and time.   Psychiatric:         Mood and Affect: Mood normal.         Thought Content: Thought content normal.        Result Review :     Common labs          4/14/2025    08:27   Common Labs   Glucose 98    BUN 15    Creatinine 1.05    Sodium 138    Potassium 4.3    Chloride 102    Calcium 9.7    Albumin 4.9    Total Bilirubin 0.4    Alkaline Phosphatase 70    AST (SGOT) 27    ALT (SGPT) 32    WBC 5.7    Hemoglobin 15.1    Hematocrit 44.8    Platelets 325    Total Cholesterol 225    Triglycerides 160    HDL Cholesterol 36    LDL Cholesterol  160    Hemoglobin A1C 5.8               Assessment / Plan        Diagnoses and all orders for this visit:    1. Class 3 severe obesity due to excess calories without serious comorbidity with body mass index (BMI) of 45.0 to 49.9 in adult (Primary)  Assessment & Plan:  Patient's (Body mass index is 46.83 kg/m².) indicates that they are morbidly/severely obese (BMI > 40 or > 35 with obesity - related health condition) with health " conditions that include hypertension and GERD . Weight is improving with treatment. BMI  is above average; BMI management plan is completed. We discussed low calorie, low carb based diet program, portion control, increasing exercise, and pharmacologic options including phentermine .    I have instructed the patient to continue with pursuit of medical weight loss as a part of this program. Patient does meet criteria for use of anorectics at this time as BMI >30 .     The current plan for this month includes:   - Adjust exercise as discussed  - It is appropriate to continue anorectic medications as prescribed at this time  >>BP still elevated but has not worsened with use of phentermine. Will continue phentermine but half the dose. He will watch his BP at home and if raises above baseline will discontinue phentermine.   - Increase water to recommended daily amount  - Weight loss goal 4-6lbs this month  - Adjust macronutrients as discussed. Bring food journal to next visit for review  >>Continue great effort in protein intake, work on decreasing net carbs further       Orders:  -     phentermine (ADIPEX-P) 37.5 MG tablet; Take 0.5 tablets by mouth Daily.  Dispense: 15 tablet; Refill: 0        We discussed the risks, benefits, and limitations of treatments. Continue medications and OTC supplements as discussed. Patient verbalizes understanding of and agreement with management plan.     Follow Up   Return in about 4 weeks (around 6/12/2025).  Patient was given instructions and counseling regarding his condition or for health maintenance advice. Please see specific information pulled into the AVS if appropriate.     I spent 30 minutes on this date of service. This time includes time spent by me in the following activities:preparing for the visit, counseling and educating the patient/family/caregiver, ordering medications, tests, or procedures and documenting information in the medical record.    Anthony Aldridge,  APRN  05/15/2025

## 2025-05-15 NOTE — ASSESSMENT & PLAN NOTE
Patient's (Body mass index is 46.83 kg/m².) indicates that they are morbidly/severely obese (BMI > 40 or > 35 with obesity - related health condition) with health conditions that include hypertension and GERD . Weight is improving with treatment. BMI  is above average; BMI management plan is completed. We discussed low calorie, low carb based diet program, portion control, increasing exercise, and pharmacologic options including phentermine .    I have instructed the patient to continue with pursuit of medical weight loss as a part of this program. Patient does meet criteria for use of anorectics at this time as BMI >30 .     The current plan for this month includes:   - Adjust exercise as discussed  - It is appropriate to continue anorectic medications as prescribed at this time  >>BP still elevated but has not worsened with use of phentermine. Will continue phentermine but half the dose. He will watch his BP at home and if raises above baseline will discontinue phentermine.   - Increase water to recommended daily amount  - Weight loss goal 4-6lbs this month  - Adjust macronutrients as discussed. Bring food journal to next visit for review  >>Continue great effort in protein intake, work on decreasing net carbs further